# Patient Record
Sex: MALE | Race: OTHER
[De-identification: names, ages, dates, MRNs, and addresses within clinical notes are randomized per-mention and may not be internally consistent; named-entity substitution may affect disease eponyms.]

---

## 2018-02-06 ENCOUNTER — HOSPITAL ENCOUNTER (EMERGENCY)
Dept: HOSPITAL 72 - EMR | Age: 38
Discharge: HOME | End: 2018-02-06
Payer: SELF-PAY

## 2018-02-06 VITALS — SYSTOLIC BLOOD PRESSURE: 145 MMHG | DIASTOLIC BLOOD PRESSURE: 84 MMHG

## 2018-02-06 VITALS — BODY MASS INDEX: 26.68 KG/M2 | HEIGHT: 67 IN | WEIGHT: 170 LBS

## 2018-02-06 VITALS — SYSTOLIC BLOOD PRESSURE: 118 MMHG | DIASTOLIC BLOOD PRESSURE: 76 MMHG

## 2018-02-06 DIAGNOSIS — R10.13: Primary | ICD-10-CM

## 2018-02-06 LAB
ADD MANUAL DIFF: NO
ALBUMIN SERPL-MCNC: 4.2 G/DL (ref 3.4–5)
ALBUMIN/GLOB SERPL: 1.2 {RATIO} (ref 1–2.7)
ALP SERPL-CCNC: 87 U/L (ref 46–116)
ALT SERPL-CCNC: 65 U/L (ref 12–78)
ANION GAP SERPL CALC-SCNC: 6 MMOL/L (ref 5–15)
AST SERPL-CCNC: 26 U/L (ref 15–37)
BASOPHILS NFR BLD AUTO: 0.6 % (ref 0–2)
BILIRUB SERPL-MCNC: 0.5 MG/DL (ref 0.2–1)
BUN SERPL-MCNC: 14 MG/DL (ref 7–18)
CALCIUM SERPL-MCNC: 9 MG/DL (ref 8.5–10.1)
CHLORIDE SERPL-SCNC: 98 MMOL/L (ref 98–107)
CO2 SERPL-SCNC: 30 MMOL/L (ref 21–32)
CREAT SERPL-MCNC: 1 MG/DL (ref 0.55–1.3)
EOSINOPHIL NFR BLD AUTO: 0.6 % (ref 0–3)
ERYTHROCYTE [DISTWIDTH] IN BLOOD BY AUTOMATED COUNT: 11.8 % (ref 11.6–14.8)
GLOBULIN SER-MCNC: 3.6 G/DL
HCT VFR BLD CALC: 47.7 % (ref 42–52)
HGB BLD-MCNC: 16.7 G/DL (ref 14.2–18)
LYMPHOCYTES NFR BLD AUTO: 12.1 % (ref 20–45)
MCV RBC AUTO: 87 FL (ref 80–99)
MONOCYTES NFR BLD AUTO: 4.9 % (ref 1–10)
NEUTROPHILS NFR BLD AUTO: 81.7 % (ref 45–75)
PLATELET # BLD: 198 K/UL (ref 150–450)
POTASSIUM SERPL-SCNC: 3.2 MMOL/L (ref 3.5–5.1)
RBC # BLD AUTO: 5.46 M/UL (ref 4.7–6.1)
SODIUM SERPL-SCNC: 134 MMOL/L (ref 136–145)
WBC # BLD AUTO: 9.8 K/UL (ref 4.8–10.8)

## 2018-02-06 PROCEDURE — 83690 ASSAY OF LIPASE: CPT

## 2018-02-06 PROCEDURE — 99284 EMERGENCY DEPT VISIT MOD MDM: CPT

## 2018-02-06 PROCEDURE — 85025 COMPLETE CBC W/AUTO DIFF WBC: CPT

## 2018-02-06 PROCEDURE — 83605 ASSAY OF LACTIC ACID: CPT

## 2018-02-06 PROCEDURE — 96374 THER/PROPH/DIAG INJ IV PUSH: CPT

## 2018-02-06 PROCEDURE — 80053 COMPREHEN METABOLIC PANEL: CPT

## 2018-02-06 PROCEDURE — 36415 COLL VENOUS BLD VENIPUNCTURE: CPT

## 2018-02-06 NOTE — EMERGENCY ROOM REPORT
History of Present Illness


General


Chief Complaint:  Abdominal Pain


Source:  Patient


 (RON MENDOZA M.D.)





Present Illness


HPI


37YOM with epigastric pain since 2am, 1 hour after given chicken/rice by 

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


No nausea/vomiting, diarrhea


No previous abd/pelvic surgery


No fever/chills


No sick contacts


Took yariel seltzer with some improvement


 (RON MENDOZA M.D.)


Allergies:  


Coded Allergies:  


     No Known Allergies (Unverified , 2/6/18)





Patient History


Past Medical History:  none


Past Surgical History:  none


Pertinent Family History:  none


Social History:  Denies: smoking, alcohol use, drug use


Immunizations:  UTD


Reviewed Nursing Documentation:  PMH: Agreed, PSxH: Agreed (RON MENDOZA M.D.)





Nursing Documentation-PMH


Past Medical History:  No Stated History


 (RON MENDOZA M.D.)





Review of Systems


All Other Systems:  negative except mentioned in HPI


 (RON MENDOZA M.D.)





Physical Exam





Vital Signs








  Date Time  Temp Pulse Resp B/P (MAP) Pulse Ox O2 Delivery O2 Flow Rate FiO2


 


2/6/18 05:24 97.3 69 18 118/76 97 Room Air  








Sp02 EP Interpretation:  reviewed, normal


General Appearance:  normal inspection, well appearing, no apparent distress, 

alert, GCS 15, non-toxic


Head:  normocephalic, atraumatic


Eyes:  bilateral eye PERRL, bilateral eye EOMI


ENT:  normal ENT inspection, hearing grossly normal, normal pharynx, no 

angioedema, normal voice, TMs + canals normal, uvula midline, moist mucus 

membranes


Neck:  normal inspection, full range of motion, supple, thyroid normal, no 

meningismus, no bony tend


Respiratory:  normal inspection, lungs clear, normal breath sounds, no rhonchi, 

no respiratory distress, no retraction, no accessory muscle use, no wheezing, 

speaking full sentences


Cardiovascular #1:  regular rate, rhythm, no edema, no JVD, normal capillary 

refill


Gastrointestinal:  normal inspection, normal bowel sounds, soft, no mass, no 

peritonitis, non-distended, no guarding, no hernia, no pulsatile mass, other - +

epigastric ttp


Genitourinary:  no CVA tenderness


Musculoskeletal:  normal inspection, back normal, normal range of motion, no 

calf tenderness, pelvis stable, Arleth's Sign negative


Neurologic:  normal inspection, alert, oriented x3, responsive, CNs III-XII nml 

as tested, motor strength/tone normal, cerebellar normal, normal gait, speech 

normal


Psychiatric:  normal inspection, judgement/insight normal, mood/affect normal, 

no suicidal/homicidal ideation, no delusions


Skin:  normal inspection, normal color, no rash


Lymphatic:  normal inspection, no adenopathy


 (RON MENDOZA M.D.)


Sp02 EP Interpretation:  reviewed, normal


General Appearance:  no apparent distress, alert, GCS 15, non-toxic


Head:  normocephalic, atraumatic


Eyes:  bilateral eye normal inspection, bilateral eye PERRL


ENT:  hearing grossly normal, normal pharynx, no angioedema, normal voice


Neck:  full range of motion, supple/symm/no masses


Respiratory:  chest non-tender, lungs clear, normal breath sounds, speaking 

full sentences


Cardiovascular #1:  regular rate, rhythm, no edema


Gastrointestinal:  normal bowel sounds, soft, non-distended, no guarding, no 

rebound, tenderness - ttp in the epigastrium


Rectal:  deferred


Musculoskeletal:  back normal, gait/station normal, normal range of motion, non-

tender


Neurologic:  alert, oriented x3, responsive, motor strength/tone normal, 

sensory intact, speech normal


Psychiatric:  judgement/insight normal, memory normal, mood/affect normal, no 

suicidal/homicidal ideation


Skin:  normal color, no rash, warm/dry, well hydrated


 (DAVID AMAYAOJuan C)





Medical Decision Making


Diagnostic Impression:  


 Primary Impression:  


 Abdominal pain


 Qualified Codes:  R10.13 - Epigastric pain


ER Course


VSS, Afebrile


HPI, PE, assessment c/w gastritis


Labs: No leuks; no metabolic or LFT abnormalities to suggest pancreatitis or 

biliary disease


Lactate WNL


Patient feels "a little better" after serial assessment at time of signout


Endorsed to Dr Gonzalez at 630am for ultimate reassessment and disposition


 (RON MENDOZA M.D.)


ER Course


This patient has a clinical presentation consistent with gastritis.  The 

location of the pain and history and physical examination is consistent with 

this.  I considered other concerning differentials, to include appendicitis, 

cholelithiasis, cholecystitis, pancreatitis, perforated viscus, aortic aneurysm

, and pyelonephritis to name a few.  However, laboratory workup in combination 

with medical and surgical history and physical exam makes these unlikely at 

this time.  I did educate the patient extensively on early appendicitis.  I 

educated the patient that this could be undiagnosed early appendicitis.  He was 

instructed to return for persistent symptoms or symptoms that migrates to the 

right lower quadrant.  He is also instructed to return immediately for fever.  

I did educate the patient on close return precautions and followup instructions.





Laboratory Tests








Test


  2/6/18


05:55


 


White Blood Count


  9.8 K/UL


(4.8-10.8)


 


Red Blood Count


  5.46 M/UL


(4.70-6.10)


 


Hemoglobin


  16.7 G/DL


(14.2-18.0)


 


Hematocrit


  47.7 %


(42.0-52.0)


 


Mean Corpuscular Volume 87 FL (80-99)  


 


Mean Corpuscular Hemoglobin


  30.6 PG


(27.0-31.0)


 


Mean Corpuscular Hemoglobin


Concent 35.0 G/DL


(32.0-36.0)


 


Red Cell Distribution Width


  11.8 %


(11.6-14.8)


 


Platelet Count


  198 K/UL


(150-450)


 


Mean Platelet Volume


  9.8 FL


(6.5-10.1)


 


Neutrophils (%) (Auto)


  81.7 %


(45.0-75.0)  H


 


Lymphocytes (%) (Auto)


  12.1 %


(20.0-45.0)  L


 


Monocytes (%) (Auto)


  4.9 %


(1.0-10.0)


 


Eosinophils (%) (Auto)


  0.6 %


(0.0-3.0)


 


Basophils (%) (Auto)


  0.6 %


(0.0-2.0)


 


Sodium Level


  134 MMOL/L


(136-145)  L


 


Potassium Level


  3.2 MMOL/L


(3.5-5.1)  L


 


Chloride Level


  98 MMOL/L


()


 


Carbon Dioxide Level


  30 MMOL/L


(21-32)


 


Anion Gap


  6 mmol/L


(5-15)


 


Blood Urea Nitrogen


  14 mg/dL


(7-18)


 


Creatinine


  1.0 MG/DL


(0.55-1.30)


 


Estimate Glomerular


Filtration Rate > 60 mL/min


(>60)


 


Glucose Level


  135 MG/DL


()  H


 


Lactic Acid Level


  1.30 mmol/L


(0.66-2.22)


 


Calcium Level


  9.0 MG/DL


(8.5-10.1)


 


Total Bilirubin


  0.5 MG/DL


(0.2-1.0)


 


Aspartate Amino Transferase


(AST) 26 U/L (15-37)


 


 


Alanine Aminotransferase (ALT)


  65 U/L (12-78)


 


 


Alkaline Phosphatase


  87 U/L


()


 


Total Protein


  7.8 G/DL


(6.4-8.2)


 


Albumin


  4.2 G/DL


(3.4-5.0)


 


Globulin 3.6 g/dL  


 


Albumin/Globulin Ratio 1.2 (1.0-2.7)  


 


Lipase


  138 U/L


()








 (DAVID AMAYA D.O.)





Last Vital Signs








  Date Time  Temp Pulse Resp B/P (MAP) Pulse Ox O2 Delivery O2 Flow Rate FiO2


 


2/6/18 05:24 97.3 69 18 118/76 97 Room Air  








Status:  improved


 (RON MENDOZA M.D.)


Disposition:  HOME, SELF-CARE


Condition:  Improved


Scripts


Pantoprazole* (PROTONIX*) 40 Mg Tablet.dr


40 MG ORAL DAILY, #30 TAB


   Prov: DAVID AMAYA D.O.         2/6/18 


Ondansetron Odt* (ZOFRAN ODT*) 4 Mg Tab.rapdis


4 MG ORAL Q6H Y for Nausea & Vomiting, #10 TAB 0 Refills


   Prov: DAVID AMAYA D.O.         2/6/18 


Mag Hydrox/Al Hydrox/Simeth (MAALOX MAXIMUM STRENGTH SUSP) 355 Ml Oral.susp


15 ML PO Q4HR, #355 ML


   Prov: DAVID AMAYA D.O.         2/6/18


Patient Instructions:  Gastritis, Adult, Abdominal Pain, Adult











RON MENDOZA M.D. Feb 6, 2018 05:41


DAVID AMAYA D.O. Feb 6, 2018 07:53

## 2018-02-07 ENCOUNTER — HOSPITAL ENCOUNTER (INPATIENT)
Dept: HOSPITAL 72 - EMR | Age: 38
LOS: 1 days | Discharge: HOME | DRG: 284 | End: 2018-02-08
Payer: MEDICAID

## 2018-02-07 VITALS — DIASTOLIC BLOOD PRESSURE: 61 MMHG | SYSTOLIC BLOOD PRESSURE: 115 MMHG

## 2018-02-07 VITALS — SYSTOLIC BLOOD PRESSURE: 101 MMHG | DIASTOLIC BLOOD PRESSURE: 68 MMHG

## 2018-02-07 VITALS — DIASTOLIC BLOOD PRESSURE: 69 MMHG | SYSTOLIC BLOOD PRESSURE: 118 MMHG

## 2018-02-07 VITALS — SYSTOLIC BLOOD PRESSURE: 118 MMHG | DIASTOLIC BLOOD PRESSURE: 67 MMHG

## 2018-02-07 VITALS — DIASTOLIC BLOOD PRESSURE: 65 MMHG | SYSTOLIC BLOOD PRESSURE: 116 MMHG

## 2018-02-07 VITALS — HEIGHT: 67 IN | WEIGHT: 175 LBS | BODY MASS INDEX: 27.47 KG/M2

## 2018-02-07 VITALS — SYSTOLIC BLOOD PRESSURE: 146 MMHG | DIASTOLIC BLOOD PRESSURE: 82 MMHG

## 2018-02-07 VITALS — SYSTOLIC BLOOD PRESSURE: 116 MMHG | DIASTOLIC BLOOD PRESSURE: 64 MMHG

## 2018-02-07 DIAGNOSIS — K21.9: ICD-10-CM

## 2018-02-07 DIAGNOSIS — K80.70: Primary | ICD-10-CM

## 2018-02-07 LAB
ADD MANUAL DIFF: NO
ALBUMIN SERPL-MCNC: 4.3 G/DL (ref 3.4–5)
ALBUMIN/GLOB SERPL: 1.2 {RATIO} (ref 1–2.7)
ALP SERPL-CCNC: 77 U/L (ref 46–116)
ALT SERPL-CCNC: 60 U/L (ref 12–78)
ANION GAP SERPL CALC-SCNC: 8 MMOL/L (ref 5–15)
AST SERPL-CCNC: 24 U/L (ref 15–37)
BASOPHILS NFR BLD AUTO: 0.5 % (ref 0–2)
BILIRUB SERPL-MCNC: 0.5 MG/DL (ref 0.2–1)
BUN SERPL-MCNC: 7 MG/DL (ref 7–18)
CALCIUM SERPL-MCNC: 9.2 MG/DL (ref 8.5–10.1)
CHLORIDE SERPL-SCNC: 101 MMOL/L (ref 98–107)
CO2 SERPL-SCNC: 31 MMOL/L (ref 21–32)
CREAT SERPL-MCNC: 1 MG/DL (ref 0.55–1.3)
EOSINOPHIL NFR BLD AUTO: 0 % (ref 0–3)
ERYTHROCYTE [DISTWIDTH] IN BLOOD BY AUTOMATED COUNT: 11.4 % (ref 11.6–14.8)
GLOBULIN SER-MCNC: 3.5 G/DL
HCT VFR BLD CALC: 47.2 % (ref 42–52)
HGB BLD-MCNC: 16.8 G/DL (ref 14.2–18)
LYMPHOCYTES NFR BLD AUTO: 12 % (ref 20–45)
MCV RBC AUTO: 86 FL (ref 80–99)
MONOCYTES NFR BLD AUTO: 10 % (ref 1–10)
NEUTROPHILS NFR BLD AUTO: 77.5 % (ref 45–75)
PLATELET # BLD: 214 K/UL (ref 150–450)
POTASSIUM SERPL-SCNC: 3.3 MMOL/L (ref 3.5–5.1)
RBC # BLD AUTO: 5.49 M/UL (ref 4.7–6.1)
SODIUM SERPL-SCNC: 140 MMOL/L (ref 136–145)
WBC # BLD AUTO: 11.7 K/UL (ref 4.8–10.8)

## 2018-02-07 PROCEDURE — 74177 CT ABD & PELVIS W/CONTRAST: CPT

## 2018-02-07 PROCEDURE — 36415 COLL VENOUS BLD VENIPUNCTURE: CPT

## 2018-02-07 PROCEDURE — 85730 THROMBOPLASTIN TIME PARTIAL: CPT

## 2018-02-07 PROCEDURE — 78266 GSTR EMPT IMG SM BWL&COLON: CPT

## 2018-02-07 PROCEDURE — 99285 EMERGENCY DEPT VISIT HI MDM: CPT

## 2018-02-07 PROCEDURE — 83690 ASSAY OF LIPASE: CPT

## 2018-02-07 PROCEDURE — 85025 COMPLETE CBC W/AUTO DIFF WBC: CPT

## 2018-02-07 PROCEDURE — 80053 COMPREHEN METABOLIC PANEL: CPT

## 2018-02-07 PROCEDURE — 82150 ASSAY OF AMYLASE: CPT

## 2018-02-07 PROCEDURE — 76700 US EXAM ABDOM COMPLETE: CPT

## 2018-02-07 RX ADMIN — HEPARIN SODIUM SCH UNITS: 5000 INJECTION INTRAVENOUS; SUBCUTANEOUS at 21:20

## 2018-02-07 RX ADMIN — DEXTROSE AND SODIUM CHLORIDE SCH MLS/HR: 5; .45 INJECTION, SOLUTION INTRAVENOUS at 22:29

## 2018-02-07 RX ADMIN — DEXTROSE AND SODIUM CHLORIDE SCH MLS/HR: 5; .45 INJECTION, SOLUTION INTRAVENOUS at 18:45

## 2018-02-07 RX ADMIN — PANTOPRAZOLE SODIUM SCH MG: 40 INJECTION, POWDER, FOR SOLUTION INTRAVENOUS at 08:26

## 2018-02-07 RX ADMIN — HEPARIN SODIUM SCH UNITS: 5000 INJECTION INTRAVENOUS; SUBCUTANEOUS at 08:31

## 2018-02-07 RX ADMIN — DEXTROSE AND SODIUM CHLORIDE SCH MLS/HR: 5; .45 INJECTION, SOLUTION INTRAVENOUS at 06:18

## 2018-02-07 NOTE — DIAGNOSTIC IMAGING REPORT
Indication: Abdominal pain

 

Technique: Gray-scale and duplex images of the upper abdomen were obtained

 

Comparison: Reference made to CT scan performed one hour earlier

 

Findings: Gallbladder is filled with gallstones. Gallbladder wall does not appear to

be thickened.  Sonographic Berumen's sign is negative.  Common bile duct measures 5 mm

in diameter.  No intrahepatic biliary ductal dilatation.  Liver demonstrates

diffusely increased echogenicity, consistent with fatty change described on recent

CT.   Portal vein and hepatic veins are patent.   Pancreas is obscured by bowel gas. 

  Spleen is unremarkable.  Left kidney measures 10.9 cm in length.  Right kidney

measures 10.3 cm length.  Both kidneys demonstrate normal echogenicity.  There is no

hydronephrosis.   No focal abnormality . Abdominal aorta is partially obscured by

bowel gas, visualized portions are non-aneurysmal .

 

Impression: Cholelithiasis. No definite gallbladder wall thickening. Negative

sonographic Berumen's. Negative for dilated ducts

 

Liver demonstrates diffusely increased echogenicity, consistent with fatty change

demonstrated on recent CT scan.

 

Note nonvisualization of the pancreas, suboptimal visualization of the abdominal

aorta

## 2018-02-07 NOTE — CONSULTATION
History of Present Illness


General


Date patient seen:  Feb 7, 2018


Chief Complaint:  Abdominal Pain


Reason for Consultation:  abdominal pain





Present Illness


HPI


37 year old otherwise healthy mail presented to ED with complaints of 

intractable abdominal pain.  Patient states that 2 days prior to admission he 

first noted pain.  Pain described as intermittent epigastric/Left upper 

quadrant pain that is 10/10 at max.  Pain without radiation.  no n/v/f/c.  

Initially came to ED day prior to admission with similar symptoms and improved 

prior to d/c.  went home with Rx but developed symptoms again so came back to 

ED.  Cannot recall if food correlation.  States usually has 3-4 BM's per day 

but has been constipated the past few days.  In ED had CT scan which 

demonstrated gallstones.  Ultrasound without significant findings.  Surgery 

called to evaluate.  When seen at bedside this AM patient states that pain has 

resolved.  Currently comfortable.  Has not had prior episodes.


Allergies:  


Coded Allergies:  


     No Known Allergies (Unverified , 2/6/18)





Medication History


Scheduled


Mag Hydrox/Al Hydrox/Simeth (Maalox Maximum Strength Susp), 15 ML PO Q4HR


No Known Medications* (NKM - No Known Medications*), 0 ., (Reported)


Pantoprazole* (Protonix*), 40 MG ORAL DAILY





Scheduled PRN


Ondansetron Odt* (Zofran Odt*), 4 MG ORAL Q6H PRN for Nausea & Vomiting





Patient History


History Provided By:  Patient


Healthcare decision maker





Resuscitation status


Full Code


Advanced Directive on File








Past Medical/Surgical History


Past Medical/Surgical History:  


(1) Abdominal pain


(2) Cholelithiasis


(3) Biliary colic





Review of Systems


Constitutional:  Denies: no symptoms, see HPI, chills, sweats, fever, malaise, 

weakness, other


Eye:  Denies: no symptoms, see HPI, eye pain, blurred vision, tearing, double 

vision, nose pain, nose congestion, acuity changes, discharge, other


ENT:  Denies: no symptoms, see HPI, ear pain, ear discharge, nose pain, nose 

congestion, throat pain, throat swelling, mouth pain, hearing loss, nasal 

discharge, other


Cardiovascular:  Denies: no symptoms, see HPI, chest pain, edema, palpitations, 

syncope, PND, other


Gastrointestinal:  Reports: abdominal pain, constipation


Genitourinary:  Denies: no symptoms, see HPI, discharge, dysuria, frequency, 

hematuria, pain, retention, incontinence, urgency, vag bleed/dc, other


Musculoskeletal:  Denies: no symptoms, see HPI, back pain, gout, joint pain, 

joint swelling, muscle pain, muscle stiffness, other


Skin:  Denies: no symptoms, see HPI, rash, change in color, change in hair/nails

, dryness, lesions, other


Psychiatric:  Denies: no symptoms, see HPI, prior hx, anxiety, depressed 

feelings, emotional problems, SI, HI, hallucinations, other


Neurological:  Denies: no symptoms, see HPI, headache, numbness, paresthesia, 

seizure, tingling, tremors, focal weakness, syncope, dizziness, other


Endocrine:  Denies: no symptoms, see HPI, excessive sweating, flushing, 

intolerance to temperature, increased thirst, increased urine, unexplained 

weight loss, other


Hematologic/Lymphatic:  Denies: no symptoms, see HPI, anemia, blood clots, easy 

bleeding, easy bruising, swollen glands, diathesis, other





Physical Exam


General Appearance:  no apparent distress, alert


Lines, tubes and drains:  peripheral


HEENT:  normocephalic, atraumatic, PERRL


Neck:  supple, normal inspection


Respiratory/Chest:  normal breath sounds, no respiratory distress, no accessory 

muscle use


Cardiovascular/Chest:  normal peripheral pulses, normal rate, regular rhythm


Abdomen:  normal bowel sounds, non tender, soft, no organomegaly, no mass


Extremities:  non-tender, normal inspection


Skin Exam:  normal pigmentation, warm/dry


Neurologic:  alert, oriented x 3





Last 24 Hour Vital Signs








  Date Time  Temp Pulse Resp B/P (MAP) Pulse Ox O2 Delivery O2 Flow Rate FiO2


 


2/7/18 08:00 97.7 56 20 116/64 98   


 


2/7/18 04:15 97.9 59 16 115/61 98   


 


2/7/18 04:11  65 16 101/68 97 Room Air  


 


2/7/18 03:44 98.8 65 16 101/68 97 Room Air  


 


2/7/18 00:40 98.4  19 146/82 96 Room Air  


 


2/7/18 00:10 98.4 69 19 146/82 96 Room Air  

















Intake and Output  


 


 2/6/18 2/7/18





 19:00 07:00


 


Intake Total  1160 ml


 


Balance  1160 ml


 


  


 


IV Total  1160 ml


 


# Voids  2











Laboratory Tests








Test


  2/7/18


00:50


 


White Blood Count


  11.7 K/UL


(4.8-10.8)  H


 


Red Blood Count


  5.49 M/UL


(4.70-6.10)


 


Hemoglobin


  16.8 G/DL


(14.2-18.0)


 


Hematocrit


  47.2 %


(42.0-52.0)


 


Mean Corpuscular Volume 86 FL (80-99)  


 


Mean Corpuscular Hemoglobin


  30.6 PG


(27.0-31.0)


 


Mean Corpuscular Hemoglobin


Concent 35.7 G/DL


(32.0-36.0)


 


Red Cell Distribution Width


  11.4 %


(11.6-14.8)  L


 


Platelet Count


  214 K/UL


(150-450)


 


Mean Platelet Volume


  9.1 FL


(6.5-10.1)


 


Neutrophils (%) (Auto)


  77.5 %


(45.0-75.0)  H


 


Lymphocytes (%) (Auto)


  12.0 %


(20.0-45.0)  L


 


Monocytes (%) (Auto)


  10.0 %


(1.0-10.0)


 


Eosinophils (%) (Auto)


  0.0 %


(0.0-3.0)


 


Basophils (%) (Auto)


  0.5 %


(0.0-2.0)


 


Sodium Level


  140 MMOL/L


(136-145)


 


Potassium Level


  3.3 MMOL/L


(3.5-5.1)  L


 


Chloride Level


  101 MMOL/L


()


 


Carbon Dioxide Level


  31 MMOL/L


(21-32)


 


Anion Gap


  8 mmol/L


(5-15)


 


Blood Urea Nitrogen


  7 mg/dL (7-18)


 


 


Creatinine


  1.0 MG/DL


(0.55-1.30)


 


Estimat Glomerular Filtration


Rate > 60 mL/min


(>60)


 


Glucose Level


  133 MG/DL


()  H


 


Calcium Level


  9.2 MG/DL


(8.5-10.1)


 


Total Bilirubin


  0.5 MG/DL


(0.2-1.0)


 


Aspartate Amino Transf


(AST/SGOT) 24 U/L (15-37)


 


 


Alanine Aminotransferase


(ALT/SGPT) 60 U/L (12-78)


 


 


Alkaline Phosphatase


  77 U/L


()


 


Total Protein


  7.8 G/DL


(6.4-8.2)


 


Albumin


  4.3 G/DL


(3.4-5.0)


 


Globulin 3.5 g/dL  


 


Albumin/Globulin Ratio 1.2 (1.0-2.7)  


 


Lipase


  94 U/L


()








Height (Feet):  5


Height (Inches):  7.00


Weight (Pounds):  175


Medications





Current Medications








 Medications


  (Trade)  Dose


 Ordered  Sig/Anali


 Route


 PRN Reason  Start Time


 Stop Time Status Last Admin


Dose Admin


 


 Acetaminophen


  (Tylenol)  650 mg  Q4H  PRN


 ORAL


 fever  2/7/18 05:45


 3/9/18 05:44   


 


 


 Al Hydroxide/Mg


 Hydroxide


  (Mylanta II)  30 ml  Q6H  PRN


 ORAL


 dyspepsia  2/7/18 05:45


 3/9/18 05:44   


 


 


 Dextrose


  (Dextrose 50%)    STAT  PRN


 IV


 Hypoglycemia  2/7/18 05:45


 3/9/18 05:44   


 


 


 Dextrose/Sodium


 Chloride  1,000 ml @ 


 75 mls/hr  [id number]


 IV


   2/7/18 05:34


 3/9/18 05:33  2/7/18 06:18


 


 


 Diphenhydramine


 HCl


  (Benadryl)  25 mg  Q6H  PRN


 ORAL


 Itching/Pruritis  2/7/18 05:45


 3/9/18 05:44   


 


 


 Heparin Sodium


  (Porcine)


  (Heparin 5000


 units/ml)  5,000 units  EVERY 12  HOURS


 SUBQ


   2/7/18 09:00


 3/9/18 08:59  2/7/18 08:31


 


 


 Morphine Sulfate


  (Morphine


 Sulfate)  2 mg  EVERY 4 HOURS  PRN


 IVP


 severe  Pain (Pain Scale 7-10)  2/7/18 05:45


 2/14/18 05:44   


 


 


 Nitroglycerin


  (Ntg)  0.4 mg  Q5M X 3 DOSES PRN


 SL


 Prn Chest Pain  2/7/18 05:45


 3/9/18 05:44   


 


 


 Ondansetron HCl


  (Zofran)  4 mg  Q6H  PRN


 IVP


 Nausea & Vomiting  2/7/18 05:45


 3/9/18 05:44   


 


 


 Pantoprazole


  (Protonix)  40 mg  DAILY


 IVP


   2/7/18 09:00


 3/9/18 08:59  2/7/18 08:26


 


 


 Polyethylene


 Glycol


  (Miralax)  17 gm  HSPRN  PRN


 ORAL


 Constipation  2/7/18 05:45


 3/9/18 05:44   


 


 


 Temazepam


  (Restoril)  15 mg  HSPRN  PRN


 ORAL


 Insomnia  2/7/18 05:45


 2/14/18 05:44   


 











Assessment/Plan


Problem List:  


(1) Abdominal pain


Assessment & Plan:  37M with abdominal pain.  Feels as if mainly epigastric and 

LUQ.  no radiation.  intermittent for the past two days.  currently resolved.  

Afebrile, HD stable, labs okay (mild leukocytosis of 11k).  CT with stones.  US 

with stones.  Exam benign currently.  





Lots of stones in GB and possibly one impacted at GB neck?  possible gastritis?

  possible enteritis?   possible constipation?





Will order HIDA to ensure not gallbladder issue


NPO


IV fluids


will follow exam.  


thank you for this consult.  will follow with recs.


ICD Codes:  R10.9 - Unspecified abdominal pain


SNOMED:  [id number]


Qualifiers:  


   Qualified Codes:  R10.13 - Epigastric pain


Status:  stable











RamonaLukas Feb 7, 2018 10:00

## 2018-02-07 NOTE — DIAGNOSTIC IMAGING REPORT
Indication: Reason For Exam: ABD PAIN

 

Technique: IV administration 5.2 mCi 99m technetium mebrofenin. Images obtained over

the liver for 2 hours. At 60 minutes, 2 mg of morphine given intravenously

 

Comparison: Reference made to ultrasound and CT scan earlier the same day

 

Findings: Prompt hepatic tracer excretion. Photopenic defect seen in the gallbladder

fossa. Common bile duct visualized at 7 minutes. Activity in the duodenum visualized

at 10 minutes. No gallbladder activity is ever demonstrated

 

Impression: Nonvisualization of the gallbladder. This is concerning for acute

cholecystitis.

 

Patent cystic duct

 

Dr. Jaime notified by phone of findings at the time of interpretation

## 2018-02-07 NOTE — DIAGNOSTIC IMAGING REPORT
Clinical Indication: Abdominal pain

 

Technique:   No oral contrast utilized, per emergency room physician request. IV

administration nonionic contrast. Venous phase spiral acquisition obtained through

the abdomen and pelvis. Multiplanar reconstructions were generated. Total dose length

product 738.01 mGycm. CTDIvol(s) 13.21 mGy. Dose reduction achieved using automated

exposure control

 

 

Comparison: none

 

Findings: Appendix is normal in caliber and contains gas. No evidence of

diverticulosis or diverticulitis. No small bowel distention. No free or loculated

intraperitoneal air or fluid is evident. Distal esophagus is unremarkable. The

stomach is distended. The duodenum is unremarkable.

 

The liver is diffusely low in attenuation. A subcentimeter low-attenuation lesion is

seen in segment 4A. Gallbladder contains multiple calcified gallstones. There is

suggestion of gallbladder wall thickening/edema. This is best appreciated on the

coronal images. No biliary ductal dilatation. The pancreas, spleen, adrenals, kidneys

are unremarkable. No retroperitoneal mass or adenopathy. The prostate and seminal

vesicles are prominent. The bladder is somewhat distended. Included lung bases are

clear. The bones are unremarkable.

 

Impression: Cholelithiasis. Suggestion of gallbladder wall thickening raises concern

for acute cholecystitis. Consider hepatobiliary nuclear scan if there is high

clinical suspicion

 

Fatty liver

 

Prominent prostate and seminal vesicles for age

 

This agrees with the preliminary interpretation provided overnight by [facility]

teleradiology service.

 

 

 

 

 

The CT scanner at [facility] is accredited by the [employer] College of

Radiology and the scans are performed using protocols designed to limit radiation

exposure to as low as reasonably achievable to attain images of sufficient resolution

adequate for diagnostic evaluation.

## 2018-02-07 NOTE — GENERAL PROGRESS NOTE
Progress Note


Progress Note


Surgery:





patient seen and examined at bedside with no acute events.  doing well.  states 

still no pain.  pain resolved soon after admission.  no n/v/f/c.  passing 

flatus.  Discussed CT findings, Ultrasound findings, and HIDA scan results with 

patient.  Reviewed history again.  Patient with Left upper abdominal pain 

without radiation.  no right sided pain.  has never had similar symptoms and 

recent episodes are first time and have resolved.  Afebrile, HD stable, no 

leukocytosis, labs okay.  Exam benign.  





given HIDA findings there is potential for gallbladder etiology but history of 

LUQ pain, normal ultrasound and CT except for cholelithiasis, and normal labs 

do not correlate.  could potentially be atypical pain for cholecystitis.  


Offered cholecystectomy to patient.  explained risks, benefits, alternatives, 

and possibility of LUQ pain returning even after rudy.  Patient expressed 

understanding and stated that given above he would rather wait and see if pain 

returns or is resolved.  He is not interested in surgery at this time.  





-will trial oral diet. 


-possible d/c tomorrow if tolerates diet.











Lukas Greene Feb 7, 2018 17:20

## 2018-02-07 NOTE — EMERGENCY ROOM REPORT
History of Present Illness


General


Chief Complaint:  Abdominal Pain


Source:  Patient





Present Illness


HPI


37-year-old male presents ED complaining of abdominal pain with vomiting.  

Started a few hours prior to arrival.  Sharp, 10 out of 10, nonradiating.  

Denies diarrhea.  Denies chest pain shortness of breath denies fevers or 

chills.  States he was here yesterday for similar presentation.  Was given 

prescriptions for states they are not helping.  No other aggravating relieving 

factors.  Denies any other associated symptoms


Allergies:  


Coded Allergies:  


     No Known Allergies (Unverified , 2/6/18)





Patient History


Past Medical History:  none


Past Surgical History:  none


Pertinent Family History:  none


Social History:  Denies: smoking, alcohol use, drug use


Immunizations:  UTD


Reviewed Nursing Documentation:  PMH: Agreed, PSxH: Agreed





Nursing Documentation-PMH


Past Medical History:  No Stated History





Review of Systems


All Other Systems:  negative except mentioned in HPI





Physical Exam





Vital Signs








  Date Time  Temp Pulse Resp B/P (MAP) Pulse Ox O2 Delivery O2 Flow Rate FiO2


 


2/7/18 00:10 98.4 69 19 146/82 96 Room Air  








Sp02 EP Interpretation:  reviewed, normal


General Appearance:  no apparent distress, alert, GCS 15, non-toxic


Head:  normocephalic


Eyes:  bilateral eye normal inspection, bilateral eye PERRL


ENT:  normal ENT inspection


Neck:  normal inspection


Respiratory:  chest non-tender, lungs clear, normal breath sounds, speaking 

full sentences


Cardiovascular #1:  regular rate, rhythm, no edema


Gastrointestinal:  normal bowel sounds, soft, non-distended, no guarding, no 

rebound, tenderness - epigastric


Rectal:  deferred


Genitourinary:  no CVA tenderness


Musculoskeletal:  normal inspection


Neurologic:  alert, oriented x3, responsive, motor strength/tone normal, 

sensory intact, speech normal


Psychiatric:  normal inspection


Skin:  normal inspection


Lymphatic:  normal inspection





Medical Decision Making


Diagnostic Impression:  


 Primary Impression:  


 Biliary colic


ER Course


Hospital Course 


37-year-old M presents to ED with abd pain





Differential diagnoses include: Appendicitis, cholecystitis, small bowel 

obstruction





Clinical course


Patient placed on stretcher.  Cardiac monitor.  After initial history and 

physical I ordered labs, IV fluids, UA, pain medication and CT





Labs - minimal leukocytosis noted, Hb/Hct stable.  electrolytes ok. LFTs ok


CT A/P - ? cholecystitis


ABD  US - lots of gallstones





Antibiotics given.  Case discussed with Dr. Greene and he agreed to consult.

  





Case discussed with Dr. Rios and he agreed to accept the patient to his 

service for further care and support 





I feel this is a highly complex case requiring extensive working including EKG/

Rhythm strip, Xray/CT/US, Blood/urine lab work, repeat exams while in ED, and 

administration of strong opiates/narcotics for pain control, admission to 

hospital or close patient follow up.  





Diagnosis - biliary colic





Patient admitted to hospital in serious condition





Labs








Test


  2/7/18


00:50


 


White Blood Count


  11.7 K/UL


(4.8-10.8)


 


Red Blood Count


  5.49 M/UL


(4.70-6.10)


 


Hemoglobin


  16.8 G/DL


(14.2-18.0)


 


Hematocrit


  47.2 %


(42.0-52.0)


 


Mean Corpuscular Volume 86 FL (80-99) 


 


Mean Corpuscular Hemoglobin


  30.6 PG


(27.0-31.0)


 


Mean Corpuscular Hemoglobin


Concent 35.7 G/DL


(32.0-36.0)


 


Red Cell Distribution Width


  11.4 %


(11.6-14.8)


 


Platelet Count


  214 K/UL


(150-450)


 


Mean Platelet Volume


  9.1 FL


(6.5-10.1)


 


Neutrophils (%) (Auto)


  77.5 %


(45.0-75.0)


 


Lymphocytes (%) (Auto)


  12.0 %


(20.0-45.0)


 


Monocytes (%) (Auto)


  10.0 %


(1.0-10.0)


 


Eosinophils (%) (Auto)


  0.0 %


(0.0-3.0)


 


Basophils (%) (Auto)


  0.5 %


(0.0-2.0)


 


Sodium Level


  140 MMOL/L


(136-145)


 


Potassium Level


  3.3 MMOL/L


(3.5-5.1)


 


Chloride Level


  101 MMOL/L


()


 


Carbon Dioxide Level


  31 MMOL/L


(21-32)


 


Anion Gap


  8 mmol/L


(5-15)


 


Blood Urea Nitrogen 7 mg/dL (7-18) 


 


Creatinine


  1.0 MG/DL


(0.55-1.30)


 


Estimat Glomerular Filtration


Rate > 60 mL/min


(>60)


 


Glucose Level


  133 MG/DL


()


 


Calcium Level


  9.2 MG/DL


(8.5-10.1)


 


Total Bilirubin


  0.5 MG/DL


(0.2-1.0)


 


Aspartate Amino Transf


(AST/SGOT) 24 U/L (15-37) 


 


 


Alanine Aminotransferase


(ALT/SGPT) 60 U/L (12-78) 


 


 


Alkaline Phosphatase


  77 U/L


()


 


Total Protein


  7.8 G/DL


(6.4-8.2)


 


Albumin


  4.3 G/DL


(3.4-5.0)


 


Globulin 3.5 g/dL 


 


Albumin/Globulin Ratio 1.2 (1.0-2.7) 


 


Lipase


  94 U/L


()








CT/MRI/US Diagnostic Results


CT/MRI/US Diagnostic Results #1:  


   Imaging Test Ordered:  CT A/P


   Impression


mulitple gallstones. pericholecystic fluid. ? cholecystitis


CT/MRI/US Diagnostic Results #2:  


   Imaging Test Ordered:  Abd US


   Impression


lots of gallstones. unable to further evaluate due to shadowing from stones





Last Vital Signs








  Date Time  Temp Pulse Resp B/P (MAP) Pulse Ox O2 Delivery O2 Flow Rate FiO2


 


2/7/18 00:40 98.4  19 146/82 96 Room Air  


 


2/7/18 00:10  69      








Status:  improved


Disposition:  ADMITTED AS INPATIENT


Condition:  Serious


Referrals:  


NOT CHOSEN IPA/MD,REFERRING (PCP)











DAVID BARRETO M.D. Feb 7, 2018 02:53

## 2018-02-07 NOTE — CONSULTATION
History of Present Illness


General


Date patient seen:  Feb 7, 2018


Chief Complaint:  Abdominal Pain


Referring physician:  REBECCA VARMA


Reason for Consultation:  abdominal pain





Present Illness


Allergies:  


Coded Allergies:  


     No Known Allergies (Unverified , 2/6/18)





Medication History


Scheduled


Mag Hydrox/Al Hydrox/Simeth (Maalox Maximum Strength Susp), 15 ML PO Q4HR


No Known Medications* (NKM - No Known Medications*), 0 ., (Reported)


Pantoprazole* (Protonix*), 40 MG ORAL DAILY





Scheduled PRN


Ondansetron Odt* (Zofran Odt*), 4 MG ORAL Q6H PRN for Nausea & Vomiting





Patient History


Healthcare decision maker





Resuscitation status


Full Code


Advanced Directive on File








Physical Exam





Last 24 Hour Vital Signs








  Date Time  Temp Pulse Resp B/P (MAP) Pulse Ox O2 Delivery O2 Flow Rate FiO2


 


2/7/18 16:00 98.2 75 18 118/69 96   


 


2/7/18 14:13 97.9       


 


2/7/18 11:37 97.9 56 19 118/67 98   


 


2/7/18 08:00 97.7 56 20 116/64 98   


 


2/7/18 04:15 97.9 59 16 115/61 98   


 


2/7/18 04:11  65 16 101/68 97 Room Air  


 


2/7/18 03:44 98.8 65 16 101/68 97 Room Air  


 


2/7/18 00:40 98.4  19 146/82 96 Room Air  


 


2/7/18 00:10 98.4 69 19 146/82 96 Room Air  

















Intake and Output  


 


 2/6/18 2/7/18





 19:00 07:00


 


Intake Total  1160 ml


 


Balance  1160 ml


 


  


 


IV Total  1160 ml


 


# Voids  2











Laboratory Tests








Test


  2/7/18


00:50


 


White Blood Count


  11.7 K/UL


(4.8-10.8)  H


 


Red Blood Count


  5.49 M/UL


(4.70-6.10)


 


Hemoglobin


  16.8 G/DL


(14.2-18.0)


 


Hematocrit


  47.2 %


(42.0-52.0)


 


Mean Corpuscular Volume 86 FL (80-99)  


 


Mean Corpuscular Hemoglobin


  30.6 PG


(27.0-31.0)


 


Mean Corpuscular Hemoglobin


Concent 35.7 G/DL


(32.0-36.0)


 


Red Cell Distribution Width


  11.4 %


(11.6-14.8)  L


 


Platelet Count


  214 K/UL


(150-450)


 


Mean Platelet Volume


  9.1 FL


(6.5-10.1)


 


Neutrophils (%) (Auto)


  77.5 %


(45.0-75.0)  H


 


Lymphocytes (%) (Auto)


  12.0 %


(20.0-45.0)  L


 


Monocytes (%) (Auto)


  10.0 %


(1.0-10.0)


 


Eosinophils (%) (Auto)


  0.0 %


(0.0-3.0)


 


Basophils (%) (Auto)


  0.5 %


(0.0-2.0)


 


Sodium Level


  140 MMOL/L


(136-145)


 


Potassium Level


  3.3 MMOL/L


(3.5-5.1)  L


 


Chloride Level


  101 MMOL/L


()


 


Carbon Dioxide Level


  31 MMOL/L


(21-32)


 


Anion Gap


  8 mmol/L


(5-15)


 


Blood Urea Nitrogen


  7 mg/dL (7-18)


 


 


Creatinine


  1.0 MG/DL


(0.55-1.30)


 


Estimat Glomerular Filtration


Rate > 60 mL/min


(>60)


 


Glucose Level


  133 MG/DL


()  H


 


Calcium Level


  9.2 MG/DL


(8.5-10.1)


 


Total Bilirubin


  0.5 MG/DL


(0.2-1.0)


 


Aspartate Amino Transf


(AST/SGOT) 24 U/L (15-37)


 


 


Alanine Aminotransferase


(ALT/SGPT) 60 U/L (12-78)


 


 


Alkaline Phosphatase


  77 U/L


()


 


Total Protein


  7.8 G/DL


(6.4-8.2)


 


Albumin


  4.3 G/DL


(3.4-5.0)


 


Globulin 3.5 g/dL  


 


Albumin/Globulin Ratio 1.2 (1.0-2.7)  


 


Lipase


  94 U/L


()








Height (Feet):  5


Height (Inches):  7.00


Weight (Pounds):  175


Medications





Current Medications








 Medications


  (Trade)  Dose


 Ordered  Sig/Anali


 Route


 PRN Reason  Start Time


 Stop Time Status Last Admin


Dose Admin


 


 Acetaminophen


  (Tylenol)  650 mg  Q4H  PRN


 ORAL


 fever  2/7/18 05:45


 3/9/18 05:44   


 


 


 Al Hydroxide/Mg


 Hydroxide


  (Mylanta II)  30 ml  Q6H  PRN


 ORAL


 dyspepsia  2/7/18 05:45


 3/9/18 05:44   


 


 


 Dextrose


  (Dextrose 50%)    STAT  PRN


 IV


 Hypoglycemia  2/7/18 05:45


 3/9/18 05:44   


 


 


 Dextrose/Sodium


 Chloride  1,000 ml @ 


 75 mls/hr  [id number]


 IV


   2/7/18 05:34


 3/9/18 05:33  2/7/18 06:18


 


 


 Diphenhydramine


 HCl


  (Benadryl)  25 mg  Q6H  PRN


 ORAL


 Itching/Pruritis  2/7/18 05:45


 3/9/18 05:44   


 


 


 Heparin Sodium


  (Porcine)


  (Heparin 5000


 units/ml)  5,000 units  EVERY 12  HOURS


 SUBQ


   2/7/18 09:00


 3/9/18 08:59  2/7/18 08:31


 


 


 Morphine Sulfate


  (Morphine


 Sulfate)  2 mg  EVERY 4 HOURS  PRN


 IVP


 severe  Pain (Pain Scale 7-10)  2/7/18 05:45


 2/14/18 05:44  2/7/18 13:43


 


 


 Nitroglycerin


  (Ntg)  0.4 mg  Q5M X 3 DOSES PRN


 SL


 Prn Chest Pain  2/7/18 05:45


 3/9/18 05:44   


 


 


 Ondansetron HCl


  (Zofran)  4 mg  Q6H  PRN


 IVP


 Nausea & Vomiting  2/7/18 05:45


 3/9/18 05:44   


 


 


 Pantoprazole


  (Protonix)  40 mg  DAILY


 IVP


   2/7/18 09:00


 3/9/18 08:59  2/7/18 08:26


 


 


 Polyethylene


 Glycol


  (Miralax)  17 gm  HSPRN  PRN


 ORAL


 Constipation  2/7/18 05:45


 3/9/18 05:44   


 


 


 Temazepam


  (Restoril)  15 mg  HSPRN  PRN


 ORAL


 Insomnia  2/7/18 05:45


 2/14/18 05:44   


 











Assessment/Plan


Problem List:  


(1) GERD (gastroesophageal reflux disease)


ICD Codes:  K21.9 - Gastro-esophageal reflux disease without esophagitis


SNOMED:  891992755


(2) Biliary colic


ICD Codes:  K80.50 - Calculus of bile duct without cholangitis or cholecystitis 

without obstruction


SNOMED:  04494047


(3) Cholelithiasis


ICD Codes:  K80.20 - Calculus of gallbladder without cholecystitis without 

obstruction


SNOMED:  469794665


Assessment/Plan


iv fluids


npo


surgical evaluation


check electrolytes


symptomatic treatmet


iv abx


dvt prophylaxis.











HESHAM RUBIO Feb 7, 2018 17:12

## 2018-02-07 NOTE — HISTORY AND PHYSICAL REPORT
DATE OF ADMISSION:  02/07/2018



TIME:  12 noon.



CONSULTANTS:

1. Vish Pedraza M.D.

2. Cisco Jaime M.D.

3. Lukas Greene M.D.



CHIEF COMPLAINT:  Abdominal pain x2 days.



BRIEF HISTORY:  The patient is a 37-year-old male who lives at home,

presents to [facility] ER last night with history of abdominal pain x2.  No

nausea or vomiting. Pain got worse.  The patient diagnosed with biliary

colic, possible cholecystitis and admitted to medical floor for further

treatment.  Currently, calm in bed, slight abdominal pain.  No

complaint.



PAST MEDICAL HISTORY:  Nothing.



PAST SURGICAL HISTORY:  Nothing.



MEDICATIONS:  K-Dur, heparin, Protonix, Tylenol, morphine, MiraLAX, Zofran,

Restoril, Benadryl, Mylanta, and nitroglycerin.



ALLERGIES:  Denies.



SOCIAL HISTORY:  No smoking.  Occasional alcohol.  No intravenous drug

abuse.



FAMILY HISTORY:  Noncontributory.



REVIEW OF SYSTEMS:  No chest pain.  No shortness of breath.  No nausea,

vomiting, or diarrhea.



PHYSICAL EXAMINATION:

GENERAL:  Calm in bed, oriented x3, no acute distress.

VITAL SIGNS:  Temperature is 97 degrees, pulse 56, respirations 19, and

blood pressure 118/67.

CARDIOVASCULAR:  No murmur.

LUNGS:  _____.

ABDOMEN:  Bowel sounds positive.  Soft, nontender, and nondistended.

EXTREMITIES:  No cyanosis or edema.

NEUROLOGIC:  The patient moves all extremities, slightly weak.



LABORATORY AND DIAGNOSTIC DATA:  White count 11.7, otherwise CBC is normal.

BMP showed potassium 3.3 and glucose 133, otherwise BMP is normal.



ASSESSMENT:  Biliary colic, cholecystitis.



PLAN:  Continue premedications.  NPO.  IV fluids.  Pain control.  GI, Dr. Jaime to follow and Surgery, Dr. Greene to follow.  CBC and BMP in

the morning.









  ______________________________________________

  Sergio Rios D.O.





DR:  STEPH

D:  02/07/2018 12:38

T:  02/07/2018 18:37

JOB#:  [id number]

CC:

## 2018-02-07 NOTE — GI INITIAL CONSULT NOTE
History of Present Illness


General


Date patient seen:  Feb 7, 2018


Time patient seen:  11:00


Reason for Hospitalization:  Abdominal Pain


Referring physician:  REBECCA VARMA


Reason for Consultation:  abdominal pain





Present Illness


HPI


37-year-old male presents ED complaining of abdominal pain with vomiting.  

Started a few hours prior to arrival.  Sharp, 10 out of 10, nonradiating.  

Denies diarrhea.  Denies chest pain shortness of breath denies fevers or 

chills.  States he was here yesterday for similar presentation.  Was given 

prescriptions for states they are not helping.  No other aggravating relieving 

factors.  Denies any other associated symptoms.


GI consulted for abdominal pain.   Pt seen on floor, awake A&Ox4 NAD with no 

active s/sx of N/V/D.  Had 10/10 epigastric pain, now denies.  No N/V/D.  No 

diarrhea.  No recent travels.  No changes in dietary habits.  States abdominal 

pain gets worse at night, thought it was because of his wife's cooking.  No 

history of endoscopy / colonoscopies.


Home Meds


Active Scripts


Pantoprazole* (PROTONIX*) 40 Mg Tablet.dr, 40 MG ORAL DAILY, #30 TAB


   Prov:DAVID AMAYA D.O.         2/6/18


Ondansetron Odt* (ZOFRAN ODT*) 4 Mg Tab.rapdis, 4 MG ORAL Q6H Y for Nausea & 

Vomiting, #10 TAB 0 Refills


   Prov:DAVID AMAYA D.O.         2/6/18


Mag Hydrox/Al Hydrox/Simeth (MAALOX MAXIMUM STRENGTH SUSP) 355 Ml Oral.susp, 15 

ML PO Q4HR, #355 ML


   Prov:DAVID AMAYA D.O.         2/6/18


Reported Medications


No Known Medications* (NKM - No Known Medications*)  ., 0 ., 0 Refills


   2/6/18


Med list reviewed/reconciled:  Yes


Allergies:  


Coded Allergies:  


     No Known Allergies (Unverified , 2/6/18)





Patient History


History Provided By:  Patient, Medical Record


PMH Narrative


Past Medical History:  none


Past Surgical History:  none


Pertinent Family History:  none


Social History:  Denies: smoking, alcohol use, drug use


Immunizations:  UTD


Reviewed Nursing Documentation:  PMH: Agreed, PSxH: Agreed





Nursing Documentation-PMH


Past Medical History:  No Stated History


Social History:  Reports: smoking - social, alcohol use - social, Denies: drug 

use, other





Review of Systems


All Other Systems:  negative except mentioned in HPI





Physical Exam





Vital Signs








  Date Time  Temp Pulse Resp B/P (MAP) Pulse Ox O2 Delivery O2 Flow Rate FiO2


 


2/7/18 00:10 98.4 69 19 146/82 96 Room Air  








Sp02 EP Interpretation:  reviewed, normal


Labs





Laboratory Tests








Test


  2/7/18


00:50


 


White Blood Count


  11.7 K/UL


(4.8-10.8)  H


 


Red Blood Count


  5.49 M/UL


(4.70-6.10)


 


Hemoglobin


  16.8 G/DL


(14.2-18.0)


 


Hematocrit


  47.2 %


(42.0-52.0)


 


Mean Corpuscular Volume 86 FL (80-99)  


 


Mean Corpuscular Hemoglobin


  30.6 PG


(27.0-31.0)


 


Mean Corpuscular Hemoglobin


Concent 35.7 G/DL


(32.0-36.0)


 


Red Cell Distribution Width


  11.4 %


(11.6-14.8)  L


 


Platelet Count


  214 K/UL


(150-450)


 


Mean Platelet Volume


  9.1 FL


(6.5-10.1)


 


Neutrophils (%) (Auto)


  77.5 %


(45.0-75.0)  H


 


Lymphocytes (%) (Auto)


  12.0 %


(20.0-45.0)  L


 


Monocytes (%) (Auto)


  10.0 %


(1.0-10.0)


 


Eosinophils (%) (Auto)


  0.0 %


(0.0-3.0)


 


Basophils (%) (Auto)


  0.5 %


(0.0-2.0)


 


Sodium Level


  140 MMOL/L


(136-145)


 


Potassium Level


  3.3 MMOL/L


(3.5-5.1)  L


 


Chloride Level


  101 MMOL/L


()


 


Carbon Dioxide Level


  31 MMOL/L


(21-32)


 


Anion Gap


  8 mmol/L


(5-15)


 


Blood Urea Nitrogen


  7 mg/dL (7-18)


 


 


Creatinine


  1.0 MG/DL


(0.55-1.30)


 


Estimat Glomerular Filtration


Rate > 60 mL/min


(>60)


 


Glucose Level


  133 MG/DL


()  H


 


Calcium Level


  9.2 MG/DL


(8.5-10.1)


 


Total Bilirubin


  0.5 MG/DL


(0.2-1.0)


 


Aspartate Amino Transf


(AST/SGOT) 24 U/L (15-37)


 


 


Alanine Aminotransferase


(ALT/SGPT) 60 U/L (12-78)


 


 


Alkaline Phosphatase


  77 U/L


()


 


Total Protein


  7.8 G/DL


(6.4-8.2)


 


Albumin


  4.3 G/DL


(3.4-5.0)


 


Globulin 3.5 g/dL  


 


Albumin/Globulin Ratio 1.2 (1.0-2.7)  


 


Lipase


  94 U/L


()








General Appearance:  well appearing, no apparent distress, alert


Head:  normocephalic


EENT:  PERRL/EOMI, normal ENT inspection


Neck:  supple


Respiratory:  normal breath sounds, no respiratory distress


Cardiovascular:  normal rate


Gastrointestinal:  normal inspection, non tender, soft, normal bowel sounds, non

-distended


Rectal:  deferred


Genitourinary:  deferred


Musculoskeletal:  normal inspection, back normal


Neurologic:  normal inspection, alert, oriented x3, responsive


Psychiatric:  normal inspection, judgement/insight normal, memory normal


Skin:  normal inspection, normal color, no rash, warm/dry, palpation normal, 

well hydrated


Lymphatic:  normal inspection, no adenopathy


Current Medications





Current Medications








 Medications


  (Trade)  Dose


 Ordered  Sig/Anali


 Route


 PRN Reason  Start Time


 Stop Time Status Last Admin


Dose Admin


 


 Acetaminophen


  (Tylenol)  650 mg  Q4H  PRN


 ORAL


 fever  2/7/18 05:45


 3/9/18 05:44   


 


 


 Al Hydroxide/Mg


 Hydroxide


  (Mylanta II)  30 ml  Q6H  PRN


 ORAL


 dyspepsia  2/7/18 05:45


 3/9/18 05:44   


 


 


 Dextrose


  (Dextrose 50%)    STAT  PRN


 IV


 Hypoglycemia  2/7/18 05:45


 3/9/18 05:44   


 


 


 Dextrose/Sodium


 Chloride  1,000 ml @ 


 75 mls/hr  [id number]


 IV


   2/7/18 05:34


 3/9/18 05:33  2/7/18 06:18


 


 


 Diphenhydramine


 HCl


  (Benadryl)  25 mg  Q6H  PRN


 ORAL


 Itching/Pruritis  2/7/18 05:45


 3/9/18 05:44   


 


 


 Heparin Sodium


  (Porcine)


  (Heparin 5000


 units/ml)  5,000 units  EVERY 12  HOURS


 SUBQ


   2/7/18 09:00


 3/9/18 08:59  2/7/18 08:31


 


 


 Morphine Sulfate


  (Morphine


 Sulfate)  2 mg  EVERY 4 HOURS  PRN


 IVP


 severe  Pain (Pain Scale 7-10)  2/7/18 05:45


 2/14/18 05:44  2/7/18 13:43


 


 


 Nitroglycerin


  (Ntg)  0.4 mg  Q5M X 3 DOSES PRN


 SL


 Prn Chest Pain  2/7/18 05:45


 3/9/18 05:44   


 


 


 Ondansetron HCl


  (Zofran)  4 mg  Q6H  PRN


 IVP


 Nausea & Vomiting  2/7/18 05:45


 3/9/18 05:44   


 


 


 Pantoprazole


  (Protonix)  40 mg  DAILY


 IVP


   2/7/18 09:00


 3/9/18 08:59  2/7/18 08:26


 


 


 Polyethylene


 Glycol


  (Miralax)  17 gm  HSPRN  PRN


 ORAL


 Constipation  2/7/18 05:45


 3/9/18 05:44   


 


 


 Temazepam


  (Restoril)  15 mg  HSPRN  PRN


 ORAL


 Insomnia  2/7/18 05:45


 2/14/18 05:44   


 











GI: Plan


Problems:  


(1) GERD (gastroesophageal reflux disease)


(2) Cholelithiasis


(3) Biliary colic


(4) Abdominal pain


Plan


CT AP & Abdominal U/S reviewed, see full report >> cholelithiasis 





fu surgical recs >> HIDA scan r/o cholecystitis 


okay to advance diet after procedure


ppi, will add H2B qhs


pain mgmt


electrolyte replacement


fu labs





Discussed with Dr. Jaime.


Thank you for this patient referral, we will follow.











June Mccarty N.P. Feb 7, 2018 15:02

## 2018-02-08 VITALS — DIASTOLIC BLOOD PRESSURE: 61 MMHG | SYSTOLIC BLOOD PRESSURE: 118 MMHG

## 2018-02-08 VITALS — SYSTOLIC BLOOD PRESSURE: 121 MMHG | DIASTOLIC BLOOD PRESSURE: 72 MMHG

## 2018-02-08 VITALS — SYSTOLIC BLOOD PRESSURE: 118 MMHG | DIASTOLIC BLOOD PRESSURE: 75 MMHG

## 2018-02-08 VITALS — SYSTOLIC BLOOD PRESSURE: 121 MMHG | DIASTOLIC BLOOD PRESSURE: 73 MMHG

## 2018-02-08 VITALS — SYSTOLIC BLOOD PRESSURE: 121 MMHG | DIASTOLIC BLOOD PRESSURE: 76 MMHG

## 2018-02-08 LAB
ADD MANUAL DIFF: NO
ALBUMIN SERPL-MCNC: 3.9 G/DL (ref 3.4–5)
ALBUMIN/GLOB SERPL: 1 {RATIO} (ref 1–2.7)
ALP SERPL-CCNC: 66 U/L (ref 46–116)
ALT SERPL-CCNC: 48 U/L (ref 12–78)
AMYLASE SERPL-CCNC: 69 U/L (ref 25–115)
ANION GAP SERPL CALC-SCNC: 8 MMOL/L (ref 5–15)
AST SERPL-CCNC: 21 U/L (ref 15–37)
BASOPHILS NFR BLD AUTO: 0.6 % (ref 0–2)
BILIRUB SERPL-MCNC: 0.9 MG/DL (ref 0.2–1)
BUN SERPL-MCNC: 7 MG/DL (ref 7–18)
CALCIUM SERPL-MCNC: 9 MG/DL (ref 8.5–10.1)
CHLORIDE SERPL-SCNC: 102 MMOL/L (ref 98–107)
CO2 SERPL-SCNC: 27 MMOL/L (ref 21–32)
CREAT SERPL-MCNC: 0.9 MG/DL (ref 0.55–1.3)
EOSINOPHIL NFR BLD AUTO: 1.2 % (ref 0–3)
ERYTHROCYTE [DISTWIDTH] IN BLOOD BY AUTOMATED COUNT: 12 % (ref 11.6–14.8)
GLOBULIN SER-MCNC: 3.9 G/DL
HCT VFR BLD CALC: 46.3 % (ref 42–52)
HGB BLD-MCNC: 16.8 G/DL (ref 14.2–18)
LYMPHOCYTES NFR BLD AUTO: 24.7 % (ref 20–45)
MCV RBC AUTO: 88 FL (ref 80–99)
MONOCYTES NFR BLD AUTO: 11.1 % (ref 1–10)
NEUTROPHILS NFR BLD AUTO: 62.5 % (ref 45–75)
PLATELET # BLD: 181 K/UL (ref 150–450)
POTASSIUM SERPL-SCNC: 3.5 MMOL/L (ref 3.5–5.1)
RBC # BLD AUTO: 5.26 M/UL (ref 4.7–6.1)
SODIUM SERPL-SCNC: 137 MMOL/L (ref 136–145)
WBC # BLD AUTO: 6.9 K/UL (ref 4.8–10.8)

## 2018-02-08 RX ADMIN — PANTOPRAZOLE SODIUM SCH MG: 40 INJECTION, POWDER, FOR SOLUTION INTRAVENOUS at 08:28

## 2018-02-08 RX ADMIN — DEXTROSE AND SODIUM CHLORIDE SCH MLS/HR: 5; .45 INJECTION, SOLUTION INTRAVENOUS at 12:37

## 2018-02-08 RX ADMIN — HEPARIN SODIUM SCH UNITS: 5000 INJECTION INTRAVENOUS; SUBCUTANEOUS at 09:00

## 2018-02-08 NOTE — GENERAL PROGRESS NOTE
Assessment/Plan


Problem List:  


(1) Biliary colic


ICD Codes:  K80.50 - Calculus of bile duct without cholangitis or cholecystitis 

without obstruction


SNOMED:  94135930


(2) Cholelithiasis


ICD Codes:  K80.20 - Calculus of gallbladder without cholecystitis without 

obstruction


SNOMED:  421439434


(3) Abdominal pain


ICD Codes:  R10.9 - Unspecified abdominal pain


SNOMED:  60233050


Qualifiers:  


   Qualified Codes:  R10.13 - Epigastric pain


Status:  stable, progressing, tolerating diet


Assessment/Plan


abx didt cbc bmp am dc if clear





Subjective


Constitutional:  Reports: weakness


Allergies:  


Coded Allergies:  


     No Known Allergies (Unverified , 2/6/18)


All Systems:  reviewed and negative except above


Subjective


calm in room





Objective





Last 24 Hour Vital Signs








  Date Time  Temp Pulse Resp B/P (MAP) Pulse Ox O2 Delivery O2 Flow Rate FiO2


 


2/8/18 12:00 97.9 58 20 121/72 98   


 


2/8/18 08:00 97.9 62 20 118/75 96   


 


2/8/18 04:00     98 Room Air  


 


2/8/18 04:00 97.9 59 18 121/76 98 Room Air  


 


2/8/18 00:00 98.1 60 20 118/61 98 Room Air  


 


2/8/18 00:00     98 Room Air  


 


2/7/18 20:00 98.4 66 18 116/65 98   


 


2/7/18 20:00     98 Room Air  


 


2/7/18 16:00 98.2 75 18 118/69 96   


 


2/7/18 14:13 97.9       

















Intake and Output  


 


 2/7/18 2/8/18





 19:00 07:00


 


Intake Total 550 ml 775 ml


 


Balance 550 ml 775 ml


 


  


 


IV Total 550 ml 775 ml


 


# Voids  2


 


# Bowel Movements  1








Laboratory Tests


2/8/18 05:40: 


White Blood Count 6.9, Red Blood Count 5.26, Hemoglobin 16.8, Hematocrit 46.3, 

Mean Corpuscular Volume 88, Mean Corpuscular Hemoglobin 31.9H, Mean Corpuscular 

Hemoglobin Concent 36.2H, Red Cell Distribution Width 12.0, Platelet Count 181, 

Mean Platelet Volume 8.7, Neutrophils (%) (Auto) 62.5, Lymphocytes (%) (Auto) 

24.7, Monocytes (%) (Auto) 11.1H, Eosinophils (%) (Auto) 1.2, Basophils (%) (

Auto) 0.6, Activated Partial Thromboplast Time 32, Sodium Level 137, Potassium 

Level 3.5, Chloride Level 102, Carbon Dioxide Level 27, Anion Gap 8, Blood Urea 

Nitrogen 7, Creatinine 0.9, Estimat Glomerular Filtration Rate > 60, Glucose 

Level 96, Calcium Level 9.0, Total Bilirubin 0.9, Aspartate Amino Transf (AST/

SGOT) 21, Alanine Aminotransferase (ALT/SGPT) 48, Alkaline Phosphatase 66, 

Total Protein 7.8, Albumin 3.9, Globulin 3.9, Albumin/Globulin Ratio 1.0, 

Amylase Level 69, Lipase 140


Height (Feet):  5


Height (Inches):  7.00


Weight (Pounds):  175


General Appearance:  alert


EENT:  PERRL/EOMI


Neck:  non-tender, normal alignment, supple


Cardiovascular:  normal peripheral pulses, normal rate, regular rhythm


Respiratory/Chest:  chest wall non-tender, lungs clear, normal breath sounds


Abdomen:  normal bowel sounds, non tender, soft


Extremities:  normal inspection


Edema:  no edema noted Arm (L), no edema noted Arm (R), no edema noted Leg (L), 

no edema noted Leg (R), no edema noted Pedal (L), no edema noted Pedal (R), no 

edema noted Generalized


Neurologic:  responsive, motor weakness


Skin:  normal pigmentation, warm/dry











REBECCA VARMA Feb 8, 2018 13:46

## 2018-02-08 NOTE — GENERAL SURGERY PROGRESS NOTE
General Surgery-Progress Note


Subjective


Symptoms:  improved, pain absent, tolerating diet, voiding well, passing flatus


Additional Comments


doing well.  no pain.  no n/v/f/c.  comfortable.  no complaints.  tolerated 

diet.  no symptoms.





Objective





Last 24 Hour Vital Signs








  Date Time  Temp Pulse Resp B/P (MAP) Pulse Ox O2 Delivery O2 Flow Rate FiO2


 


2/8/18 16:00 98.2 68 18 121/73 98   


 


2/8/18 12:00 97.9 58 20 121/72 98   


 


2/8/18 08:00 97.9 62 20 118/75 96   


 


2/8/18 04:00     98 Room Air  


 


2/8/18 04:00 97.9 59 18 121/76 98 Room Air  


 


2/8/18 00:00 98.1 60 20 118/61 98 Room Air  


 


2/8/18 00:00     98 Room Air  


 


2/7/18 20:00 98.4 66 18 116/65 98   


 


2/7/18 20:00     98 Room Air  








I&O











Intake and Output  


 


 2/7/18 2/8/18





 19:00 07:00


 


Intake Total 550 ml 775 ml


 


Balance 550 ml 775 ml


 


  


 


IV Total 550 ml 775 ml


 


# Voids  2


 


# Bowel Movements  1








Cardiovascular:  RSR


Respiratory:  clear


Abdomen:  soft, non-tender, present bowel sounds


Extremities:  no edema, no tenderness





Laboratory Tests








Test


  2/8/18


05:40


 


White Blood Count


  6.9 K/UL


(4.8-10.8)


 


Red Blood Count


  5.26 M/UL


(4.70-6.10)


 


Hemoglobin


  16.8 G/DL


(14.2-18.0)


 


Hematocrit


  46.3 %


(42.0-52.0)


 


Mean Corpuscular Volume 88 FL (80-99)  


 


Mean Corpuscular Hemoglobin


  31.9 PG


(27.0-31.0)  H


 


Mean Corpuscular Hemoglobin


Concent 36.2 G/DL


(32.0-36.0)  H


 


Red Cell Distribution Width


  12.0 %


(11.6-14.8)


 


Platelet Count


  181 K/UL


(150-450)


 


Mean Platelet Volume


  8.7 FL


(6.5-10.1)


 


Neutrophils (%) (Auto)


  62.5 %


(45.0-75.0)


 


Lymphocytes (%) (Auto)


  24.7 %


(20.0-45.0)


 


Monocytes (%) (Auto)


  11.1 %


(1.0-10.0)  H


 


Eosinophils (%) (Auto)


  1.2 %


(0.0-3.0)


 


Basophils (%) (Auto)


  0.6 %


(0.0-2.0)


 


Activated Partial


Thromboplast Time 32 SEC (23-33)


 


 


Sodium Level


  137 MMOL/L


(136-145)


 


Potassium Level


  3.5 MMOL/L


(3.5-5.1)


 


Chloride Level


  102 MMOL/L


()


 


Carbon Dioxide Level


  27 MMOL/L


(21-32)


 


Anion Gap


  8 mmol/L


(5-15)


 


Blood Urea Nitrogen


  7 mg/dL (7-18)


 


 


Creatinine


  0.9 MG/DL


(0.55-1.30)


 


Estimat Glomerular Filtration


Rate > 60 mL/min


(>60)


 


Glucose Level


  96 MG/DL


()


 


Calcium Level


  9.0 MG/DL


(8.5-10.1)


 


Total Bilirubin


  0.9 MG/DL


(0.2-1.0)


 


Aspartate Amino Transf


(AST/SGOT) 21 U/L (15-37)


 


 


Alanine Aminotransferase


(ALT/SGPT) 48 U/L (12-78)


 


 


Alkaline Phosphatase


  66 U/L


()


 


Total Protein


  7.8 G/DL


(6.4-8.2)


 


Albumin


  3.9 G/DL


(3.4-5.0)


 


Globulin 3.9 g/dL  


 


Albumin/Globulin Ratio 1.0 (1.0-2.7)  


 


Amylase Level


  69 U/L


()


 


Lipase


  140 U/L


()











Plan


Problems:  


(1) Abdominal pain


Assessment & Plan:  37M with abdominal pain.  Feels as if mainly epigastric and 

LUQ.  no radiation.  intermittent for the past two days.  currently resolved.  

Afebrile, HD stable, labs okay.  CT with stones.  US with stones.  Exam benign.

  Left upper quadrant pain resolved on admission.  HIDA positive. 





CT, ultrasound, and HIDA findings discussed with patient.  clinical history, 

course, and exam discussed with patient.  He presented with LEFT upper quadrant 

pain which resolved soon after.  He has NEVER had Right upper quadrant pain 

prior and no pain with meals or signs/symptoms of biliary pathology.  

gallstones likely incidental finding during this admission and not related to 

presenting condition. 





discussed cholelithiasis and possible outcomes.  discussed cholecystectomy risks

, benefits, and alternatives.  After long discussion decision was made for 

patient to be discharged home today.  recommend cholecystectomy to patient but 

can be done elective and he is okay with that.  can follow up with me, pcp, or 

surgeon as desired.  if any returning symptoms will return for evaluation. 





okay to d/c from surgical standpoint 














Lukas Greene Feb 8, 2018 16:29

## 2018-02-08 NOTE — GI PROGRESS NOTE
Assessment/Plan


Problems:  


(1) GERD (gastroesophageal reflux disease)


ICD Codes:  K21.9 - Gastro-esophageal reflux disease without esophagitis


SNOMED:  673286039


(2) Biliary colic


ICD Codes:  K80.50 - Calculus of bile duct without cholangitis or cholecystitis 

without obstruction


SNOMED:  27823628


(3) Cholelithiasis


ICD Codes:  K80.20 - Calculus of gallbladder without cholecystitis without 

obstruction


SNOMED:  377616711


(4) Abdominal pain


ICD Codes:  R10.9 - Unspecified abdominal pain


SNOMED:  89551977


Qualifiers:  


   Qualified Codes:  R10.13 - Epigastric pain


Status:  stable, unchanged


Status Narrative


Discussed with Dr. Jaime.


Assessment/Plan


CT AP & Abdominal U/S reviewed, see full report >> cholelithiasis 


HIDA scan positive





fu surgical recs 


pt indecisive about surgery


ppi


pain mgmt


electrolyte replacement


fu labs





Subjective


Gastrointestinal/Abdominal:  Reports: no symptoms


Subjective


tolerating clear diet





Objective





Last 24 Hour Vital Signs








  Date Time  Temp Pulse Resp B/P (MAP) Pulse Ox O2 Delivery O2 Flow Rate FiO2


 


2/8/18 08:00 97.9 62 20 118/75 96   


 


2/8/18 04:00     98 Room Air  


 


2/8/18 04:00 97.9 59 18 121/76 98 Room Air  


 


2/8/18 00:00 98.1 60 20 118/61 98 Room Air  


 


2/8/18 00:00     98 Room Air  


 


2/7/18 20:00 98.4 66 18 116/65 98   


 


2/7/18 20:00     98 Room Air  


 


2/7/18 16:00 98.2 75 18 118/69 96   


 


2/7/18 14:13 97.9       


 


2/7/18 11:37 97.9 56 19 118/67 98   

















Intake and Output  


 


 2/7/18 2/8/18





 19:00 07:00


 


Intake Total 550 ml 775 ml


 


Balance 550 ml 775 ml


 


  


 


IV Total 550 ml 775 ml


 


# Voids  2


 


# Bowel Movements  1











Laboratory Tests








Test


  2/8/18


05:40


 


White Blood Count


  6.9 K/UL


(4.8-10.8)


 


Red Blood Count


  5.26 M/UL


(4.70-6.10)


 


Hemoglobin


  16.8 G/DL


(14.2-18.0)


 


Hematocrit


  46.3 %


(42.0-52.0)


 


Mean Corpuscular Volume 88 FL (80-99)  


 


Mean Corpuscular Hemoglobin


  31.9 PG


(27.0-31.0)  H


 


Mean Corpuscular Hemoglobin


Concent 36.2 G/DL


(32.0-36.0)  H


 


Red Cell Distribution Width


  12.0 %


(11.6-14.8)


 


Platelet Count


  181 K/UL


(150-450)


 


Mean Platelet Volume


  8.7 FL


(6.5-10.1)


 


Neutrophils (%) (Auto)


  62.5 %


(45.0-75.0)


 


Lymphocytes (%) (Auto)


  24.7 %


(20.0-45.0)


 


Monocytes (%) (Auto)


  11.1 %


(1.0-10.0)  H


 


Eosinophils (%) (Auto)


  1.2 %


(0.0-3.0)


 


Basophils (%) (Auto)


  0.6 %


(0.0-2.0)


 


Activated Partial


Thromboplast Time 32 SEC (23-33)


 


 


Sodium Level


  137 MMOL/L


(136-145)


 


Potassium Level


  3.5 MMOL/L


(3.5-5.1)


 


Chloride Level


  102 MMOL/L


()


 


Carbon Dioxide Level


  27 MMOL/L


(21-32)


 


Anion Gap


  8 mmol/L


(5-15)


 


Blood Urea Nitrogen


  7 mg/dL (7-18)


 


 


Creatinine


  0.9 MG/DL


(0.55-1.30)


 


Estimat Glomerular Filtration


Rate > 60 mL/min


(>60)


 


Glucose Level


  96 MG/DL


()


 


Calcium Level


  9.0 MG/DL


(8.5-10.1)


 


Total Bilirubin


  0.9 MG/DL


(0.2-1.0)


 


Aspartate Amino Transf


(AST/SGOT) 21 U/L (15-37)


 


 


Alanine Aminotransferase


(ALT/SGPT) 48 U/L (12-78)


 


 


Alkaline Phosphatase


  66 U/L


()


 


Total Protein


  7.8 G/DL


(6.4-8.2)


 


Albumin


  3.9 G/DL


(3.4-5.0)


 


Globulin 3.9 g/dL  


 


Albumin/Globulin Ratio 1.0 (1.0-2.7)  


 


Amylase Level


  69 U/L


()


 


Lipase


  140 U/L


()








Height (Feet):  5


Height (Inches):  7.00


Weight (Pounds):  175


General Appearance:  WD/WN, no apparent distress, alert


Cardiovascular:  normal rate


Respiratory/Chest:  normal breath sounds, no respiratory distress


Abdominal Exam:  normal bowel sounds, non tender, soft


Extremities:  normal range of motion, non-tender











June Mccarty N.P. Feb 8, 2018 11:27

## 2018-02-09 NOTE — DISCHARGE SUMMARY
Discharge Summary


Hospital Course


Date of Admission


Feb 7, 2018 at 02:40


Date of Discharge


Feb 8, 2018 at 17:40


Admitting Diagnosis


intractable abdominal pain


YUNG Vargas is a 37 year old male who was admitted on Feb 7, 2018 at 02:40 

for Intractable Abdominal Pain


Hospital Course


[id number]





Discharge


Discharge Disposition


Patient was discharged to Home (01)


Discharge Diagnoses:  











Felisha Goodman NP Feb 9, 2018 10:19

## 2018-02-10 ENCOUNTER — HOSPITAL ENCOUNTER (INPATIENT)
Dept: HOSPITAL 72 - EMR | Age: 38
LOS: 3 days | Discharge: HOME | DRG: 263 | End: 2018-02-13
Payer: MEDICAID

## 2018-02-10 VITALS — DIASTOLIC BLOOD PRESSURE: 64 MMHG | SYSTOLIC BLOOD PRESSURE: 114 MMHG

## 2018-02-10 VITALS — BODY MASS INDEX: 26.68 KG/M2 | HEIGHT: 67 IN | WEIGHT: 170 LBS

## 2018-02-10 VITALS — DIASTOLIC BLOOD PRESSURE: 62 MMHG | SYSTOLIC BLOOD PRESSURE: 118 MMHG

## 2018-02-10 VITALS — SYSTOLIC BLOOD PRESSURE: 121 MMHG | DIASTOLIC BLOOD PRESSURE: 68 MMHG

## 2018-02-10 VITALS — DIASTOLIC BLOOD PRESSURE: 78 MMHG | SYSTOLIC BLOOD PRESSURE: 120 MMHG

## 2018-02-10 DIAGNOSIS — K80.00: Primary | ICD-10-CM

## 2018-02-10 DIAGNOSIS — K21.9: ICD-10-CM

## 2018-02-10 LAB
ADD MANUAL DIFF: NO
ALBUMIN SERPL-MCNC: 3.8 G/DL (ref 3.4–5)
ALBUMIN/GLOB SERPL: 1 {RATIO} (ref 1–2.7)
ALP SERPL-CCNC: 71 U/L (ref 46–116)
ALT SERPL-CCNC: 46 U/L (ref 12–78)
ANION GAP SERPL CALC-SCNC: 6 MMOL/L (ref 5–15)
APPEARANCE UR: CLEAR
APTT PPP: (no result) S
AST SERPL-CCNC: 20 U/L (ref 15–37)
BASOPHILS NFR BLD AUTO: 0.8 % (ref 0–2)
BILIRUB SERPL-MCNC: 0.6 MG/DL (ref 0.2–1)
BUN SERPL-MCNC: 12 MG/DL (ref 7–18)
CALCIUM SERPL-MCNC: 9.4 MG/DL (ref 8.5–10.1)
CHLORIDE SERPL-SCNC: 101 MMOL/L (ref 98–107)
CO2 SERPL-SCNC: 30 MMOL/L (ref 21–32)
CREAT SERPL-MCNC: 1 MG/DL (ref 0.55–1.3)
EOSINOPHIL NFR BLD AUTO: 4.1 % (ref 0–3)
ERYTHROCYTE [DISTWIDTH] IN BLOOD BY AUTOMATED COUNT: 11.7 % (ref 11.6–14.8)
GLOBULIN SER-MCNC: 3.9 G/DL
GLUCOSE UR STRIP-MCNC: NEGATIVE MG/DL
HCT VFR BLD CALC: 46.6 % (ref 42–52)
HGB BLD-MCNC: 16.1 G/DL (ref 14.2–18)
KETONES UR QL STRIP: NEGATIVE
LEUKOCYTE ESTERASE UR QL STRIP: NEGATIVE
LYMPHOCYTES NFR BLD AUTO: 19.3 % (ref 20–45)
MCV RBC AUTO: 88 FL (ref 80–99)
MONOCYTES NFR BLD AUTO: 9.5 % (ref 1–10)
NEUTROPHILS NFR BLD AUTO: 66.4 % (ref 45–75)
NITRITE UR QL STRIP: NEGATIVE
PH UR STRIP: 7 [PH] (ref 4.5–8)
PLATELET # BLD: 201 K/UL (ref 150–450)
POTASSIUM SERPL-SCNC: 3.9 MMOL/L (ref 3.5–5.1)
PROT UR QL STRIP: NEGATIVE
RBC # BLD AUTO: 5.32 M/UL (ref 4.7–6.1)
SODIUM SERPL-SCNC: 137 MMOL/L (ref 136–145)
SP GR UR STRIP: 1 (ref 1–1.03)
UROBILINOGEN UR-MCNC: NORMAL MG/DL (ref 0–1)
WBC # BLD AUTO: 8.4 K/UL (ref 4.8–10.8)

## 2018-02-10 PROCEDURE — 94150 VITAL CAPACITY TEST: CPT

## 2018-02-10 PROCEDURE — 99285 EMERGENCY DEPT VISIT HI MDM: CPT

## 2018-02-10 PROCEDURE — 94003 VENT MGMT INPAT SUBQ DAY: CPT

## 2018-02-10 PROCEDURE — 80048 BASIC METABOLIC PNL TOTAL CA: CPT

## 2018-02-10 PROCEDURE — 87081 CULTURE SCREEN ONLY: CPT

## 2018-02-10 PROCEDURE — 81003 URINALYSIS AUTO W/O SCOPE: CPT

## 2018-02-10 PROCEDURE — 80053 COMPREHEN METABOLIC PANEL: CPT

## 2018-02-10 PROCEDURE — 85610 PROTHROMBIN TIME: CPT

## 2018-02-10 PROCEDURE — 36415 COLL VENOUS BLD VENIPUNCTURE: CPT

## 2018-02-10 PROCEDURE — 83690 ASSAY OF LIPASE: CPT

## 2018-02-10 PROCEDURE — 85730 THROMBOPLASTIN TIME PARTIAL: CPT

## 2018-02-10 PROCEDURE — 82150 ASSAY OF AMYLASE: CPT

## 2018-02-10 PROCEDURE — 85025 COMPLETE CBC W/AUTO DIFF WBC: CPT

## 2018-02-10 RX ADMIN — HEPARIN SODIUM SCH UNITS: 5000 INJECTION INTRAVENOUS; SUBCUTANEOUS at 09:12

## 2018-02-10 RX ADMIN — DEXTROSE AND SODIUM CHLORIDE SCH MLS/HR: 5; .45 INJECTION, SOLUTION INTRAVENOUS at 09:08

## 2018-02-10 RX ADMIN — HEPARIN SODIUM SCH UNITS: 5000 INJECTION INTRAVENOUS; SUBCUTANEOUS at 21:13

## 2018-02-10 RX ADMIN — DEXTROSE AND SODIUM CHLORIDE SCH MLS/HR: 5; .45 INJECTION, SOLUTION INTRAVENOUS at 21:10

## 2018-02-10 RX ADMIN — PANTOPRAZOLE SODIUM SCH MG: 40 INJECTION, POWDER, FOR SOLUTION INTRAVENOUS at 09:08

## 2018-02-10 NOTE — DISCHARGE SUMMARY 2 SIG
DATE OF ADMISSION:  02/07/2018



DATE OF DISCHARGE:  02/08/2018



CONSULTANTS:

1. Cisco Jaime M.D.

2. Lukas Greene M.D.

3. Vish Pedraza M.D.



BRIEF HOSPITAL COURSE:  The patient is a 37-year-old male, who came from

home.  He presented to ED complaining of abdominal pain for two days.  He

has no medical history.  On evaluation at ED, blood work showed mild

leukocytosis.  LFTs were normal.  Lipase was normal.  He had CT of the

abdomen and pelvis that showed cholelithiasis with gallbladder wall

thickening.  He underwent abdominal ultrasound with findings of

cholelithiasis.  No definite gallbladder wall thickening seen.  Negative

sonographic Berumen's.  Negative for dilated ducts.  Dr. Greene was

consulted.  The patient had intermittent pain for the past two days,

however, currently resolved.  He was continued on NPO status and was given

IV fluids.  HIDA scan done showed nonvisualization of the gallbladder.

The patient was offered cholecystectomy, however he is not interested in

surgery at this time.  He was given trial with oral diet and was

tolerating diet well.  He was passing flatus, voiding well, and

comfortable with no abdominal pain.  He was cleared for discharge.

Advised to follow up with PCP for an elective cholecystectomy.



FINAL DIAGNOSES:

1. Cholelithiasis.

2. Biliary colic.

3. Gastroesophageal reflux disease.



DISPOSITION:  The patient was discharged home.



DISCHARGE INSTRUCTIONS:  Follow up with PCP.  The patient was recommended

cholecystectomy, which can be done electively.







  ______________________________________________

  Sergio Rios D.O.



I have been assigned to dictate discharge summary on this account and I

was not involved in the patient's management.



  ______________________________________________

  Felisha Goodman N.P.





DR:  Shea

D:  02/09/2018 10:18

T:  02/10/2018 03:13

JOB#:  [id number]

CC:

## 2018-02-10 NOTE — CONSULTATION
History of Present Illness


General


Date patient seen:  Feb 10, 2018


Chief Complaint:  Abdominal Pain


Reason for Consultation:  bilary colic





Present Illness


HPI


37 year old male known to me from prior admission.  Presented earlier in the 

week with complaints of LEFT upper quadrant pain that resolved soon after 

admission.  During work up had normal CT which demonstrated cholelithiasis.  

Ultrasound demonstrated cholelithiasis but no cholecystitis.  HIDA positive.  

History reviewed and patient did NOT have prior symptoms of gallbladder 

disease.  No Right sided pain, no radiation to back, no abnormal labs, no 

relation to food.  Patient was discharged in stable condition and was to follow 

up as an outpatient.  





Patient returned to ED today complaining of pain again.  This time states pain 

is RUQ with radiation to right upper back.  Now states it is related or oral 

intake.  Labs normal. no n/v/f/c. Pain has resolved since admission.  He is 

requesting his gallbladder to be removed.


Allergies:  


Coded Allergies:  


     No Known Allergies (Unverified , 2/6/18)





Medication History


Scheduled


Mag Hydrox/Al Hydrox/Simeth (Maalox Maximum Strength Susp), 15 ML PO Q4HR


No Known Medications* (NKM - No Known Medications*), 0 ., (Reported)


Pantoprazole* (Protonix*), 40 MG ORAL DAILY





Scheduled PRN


Ondansetron Odt* (Zofran Odt*), 4 MG ORAL Q6H PRN for Nausea & Vomiting





Patient History


History Provided By:  Patient, Family Member


Healthcare decision maker


N


Resuscitation status





Advanced Directive on File








Past Medical/Surgical History


Past Medical/Surgical History:  


(1) Cholelithiasis


(2) Abdominal pain


(3) GERD (gastroesophageal reflux disease)


(4) Cholecystitis, acute with cholelithiasis


(5) Recurrent biliary colic





Review of Systems


Constitutional:  Denies: no symptoms, see HPI, chills, sweats, fever, malaise, 

weakness, other


Eye:  Denies: no symptoms, see HPI, eye pain, blurred vision, tearing, double 

vision, nose pain, nose congestion, acuity changes, discharge, other


ENT:  Denies: no symptoms, see HPI, ear pain, ear discharge, nose pain, nose 

congestion, throat pain, throat swelling, mouth pain, hearing loss, nasal 

discharge, other


Respiratory:  Denies: no symptoms, see HPI, cough, orthopnea, shortness of 

breath, stridor, wheezing, RANGEL, sputum, other


Cardiovascular:  Denies: no symptoms, see HPI, chest pain, edema, palpitations, 

syncope, PND, other


Gastrointestinal:  Reports: abdominal pain


Genitourinary:  Denies: no symptoms, see HPI, discharge, dysuria, frequency, 

hematuria, pain, retention, incontinence, urgency, vag bleed/dc, other


Musculoskeletal:  Denies: no symptoms, see HPI, back pain, gout, joint pain, 

joint swelling, muscle pain, muscle stiffness, other


Skin:  Denies: no symptoms, see HPI, rash, change in color, change in hair/nails

, dryness, lesions, other


Psychiatric:  Denies: no symptoms, see HPI, prior hx, anxiety, depressed 

feelings, emotional problems, SI, HI, hallucinations, other


Neurological:  Denies: no symptoms, see HPI, headache, numbness, paresthesia, 

seizure, tingling, tremors, focal weakness, syncope, dizziness, other


Endocrine:  Denies: no symptoms, see HPI, excessive sweating, flushing, 

intolerance to temperature, increased thirst, increased urine, unexplained 

weight loss, other


Hematologic/Lymphatic:  Denies: no symptoms, see HPI, anemia, blood clots, easy 

bleeding, easy bruising, swollen glands, diathesis, other





Physical Exam


General Appearance:  no apparent distress


Lines, tubes and drains:  peripheral


HEENT:  normocephalic, mucous membranes moist, PERRL


Neck:  normal inspection


Respiratory/Chest:  normal breath sounds, no respiratory distress, no accessory 

muscle use


Cardiovascular/Chest:  normal peripheral pulses, normal rate, regular rhythm


Abdomen:  normal bowel sounds, non tender, soft, no organomegaly, no mass


Extremities:  normal inspection


Skin Exam:  normal pigmentation


Neurologic:  alert, oriented x 3, responsive





Last 24 Hour Vital Signs








  Date Time  Temp Pulse Resp B/P (MAP) Pulse Ox O2 Delivery O2 Flow Rate FiO2


 


2/10/18 07:46  56 16 111/68 99 Room Air  


 


2/10/18 07:33 97.6 56 16 118/62 99 Room Air  


 


2/10/18 05:42 98.1       


 


2/10/18 04:30 98.1 65 16 120/78 99 Room Air  


 


2/10/18 04:27 98.1 65 16 120/78 99 Room Air  

















Intake and Output  


 


 2/9/18 2/10/18





 19:00 07:00


 


Intake Total  0 ml


 


Output Total  400 ml


 


Balance  -400 ml


 


  


 


Intake Oral  0 ml


 


Output Urine Total  400 ml











Laboratory Tests








Test


  2/10/18


04:55 2/10/18


05:43


 


White Blood Count


  8.4 K/UL


(4.8-10.8) 


 


 


Red Blood Count


  5.32 M/UL


(4.70-6.10) 


 


 


Hemoglobin


  16.1 G/DL


(14.2-18.0) 


 


 


Hematocrit


  46.6 %


(42.0-52.0) 


 


 


Mean Corpuscular Volume 88 FL (80-99)   


 


Mean Corpuscular Hemoglobin


  30.3 PG


(27.0-31.0) 


 


 


Mean Corpuscular Hemoglobin


Concent 34.6 G/DL


(32.0-36.0) 


 


 


Red Cell Distribution Width


  11.7 %


(11.6-14.8) 


 


 


Platelet Count


  201 K/UL


(150-450) 


 


 


Mean Platelet Volume


  8.7 FL


(6.5-10.1) 


 


 


Neutrophils (%) (Auto)


  66.4 %


(45.0-75.0) 


 


 


Lymphocytes (%) (Auto)


  19.3 %


(20.0-45.0)  L 


 


 


Monocytes (%) (Auto)


  9.5 %


(1.0-10.0) 


 


 


Eosinophils (%) (Auto)


  4.1 %


(0.0-3.0)  H 


 


 


Basophils (%) (Auto)


  0.8 %


(0.0-2.0) 


 


 


Sodium Level


  137 MMOL/L


(136-145) 


 


 


Potassium Level


  3.9 MMOL/L


(3.5-5.1) 


 


 


Chloride Level


  101 MMOL/L


() 


 


 


Carbon Dioxide Level


  30 MMOL/L


(21-32) 


 


 


Anion Gap


  6 mmol/L


(5-15) 


 


 


Blood Urea Nitrogen


  12 mg/dL


(7-18) 


 


 


Creatinine


  1.0 MG/DL


(0.55-1.30) 


 


 


Estimat Glomerular Filtration


Rate > 60 mL/min


(>60) 


 


 


Glucose Level


  108 MG/DL


()  H 


 


 


Calcium Level


  9.4 MG/DL


(8.5-10.1) 


 


 


Total Bilirubin


  0.6 MG/DL


(0.2-1.0) 


 


 


Aspartate Amino Transf


(AST/SGOT) 20 U/L (15-37)


  


 


 


Alanine Aminotransferase


(ALT/SGPT) 46 U/L (12-78)


  


 


 


Alkaline Phosphatase


  71 U/L


() 


 


 


Total Protein


  7.7 G/DL


(6.4-8.2) 


 


 


Albumin


  3.8 G/DL


(3.4-5.0) 


 


 


Globulin 3.9 g/dL   


 


Albumin/Globulin Ratio 1.0 (1.0-2.7)   


 


Lipase


  193 U/L


() 


 


 


Urine Color  Pale yellow  


 


Urine Appearance  Clear  


 


Urine pH  7 (4.5-8.0)  


 


Urine Specific Gravity


  


  1.005


(1.005-1.035)


 


Urine Protein


  


  Negative


(NEGATIVE)


 


Urine Glucose (UA)


  


  Negative


(NEGATIVE)


 


Urine Ketones


  


  Negative


(NEGATIVE)


 


Urine Occult Blood


  


  Negative


(NEGATIVE)


 


Urine Nitrite


  


  Negative


(NEGATIVE)


 


Urine Bilirubin


  


  Negative


(NEGATIVE)


 


Urine Urobilinogen


  


  Normal MG/DL


(0.0-1.0)


 


Urine Leukocyte Esterase


  


  Negative


(NEGATIVE)








Height (Feet):  5


Height (Inches):  7.00


Weight (Pounds):  170


Medications





Current Medications








 Medications


  (Trade)  Dose


 Ordered  Sig/Anali


 Route


 PRN Reason  Start Time


 Stop Time Status Last Admin


Dose Admin


 


 Acetaminophen


  (Tylenol)  650 mg  Q4H  PRN


 ORAL


 fever  2/10/18 07:45


 3/12/18 07:44   


 


 


 Al Hydroxide/Mg


 Hydroxide


  (Mylanta II)  30 ml  Q6H  PRN


 ORAL


 dyspepsia  2/10/18 07:45


 3/12/18 07:44   


 


 


 Dextrose


  (Dextrose 50%)    STAT  PRN


 IV


 Hypoglycemia  2/10/18 07:45


 3/12/18 07:44   


 


 


 Dextrose/Sodium


 Chloride  1,000 ml @ 


 75 mls/hr  [id number]


 IV


   2/10/18 09:00


 3/12/18 08:59  2/10/18 09:08


 


 


 Diphenhydramine


 HCl


  (Benadryl)  25 mg  Q6H  PRN


 ORAL


 Itching/Pruritis  2/10/18 07:45


 3/12/18 07:44   


 


 


 Famotidine


  (Pepcid)  20 mg  DAILY


 ORAL


   2/10/18 13:00


 3/12/18 12:59  2/10/18 13:27


 


 


 Heparin Sodium


  (Porcine)


  (Heparin 5000


 units/ml)  5,000 units  EVERY 12  HOURS


 SUBQ


   2/10/18 09:00


 3/12/18 08:59  2/10/18 09:12


 


 


 Morphine Sulfate


  (Morphine


 Sulfate)  2 mg  Q4H  PRN


 IVP


 severe  Pain (Pain Scale 7-10)  2/10/18 07:45


 2/17/18 07:44   


 


 


 Nitroglycerin


  (Ntg)  0.4 mg  Q5M X 3 DOSES PRN


 SL


 Prn Chest Pain  2/10/18 07:45


 3/12/18 07:44   


 


 


 Ondansetron HCl


  (Zofran)  4 mg  Q6H  PRN


 IVP


 Nausea & Vomiting  2/10/18 07:45


 3/12/18 07:44   


 


 


 Pantoprazole


  (Protonix)  40 mg  DAILY


 IVP


   2/10/18 09:00


 3/12/18 08:59  2/10/18 09:08


 


 


 Polyethylene


 Glycol


  (Miralax)  17 gm  HSPRN  PRN


 ORAL


 Constipation  2/10/18 07:45


 3/12/18 07:44   


 


 


 Temazepam


  (Restoril)  15 mg  HSPRN  PRN


 ORAL


 Insomnia  2/10/18 07:45


 2/17/18 07:44   


 











Assessment/Plan


Problem List:  


(1) Cholelithiasis


Assessment & Plan:  37 year male now presents with symptoms of biliary colic.  

Prior presented with left sided pain that resolved and did not have relation to 

food.  identified to have cholelithiasis with positive HIDA on prior admission 

but asymptomatic.  now presents with "classic" symptoms of symptomatic 

cholelithiasis; RUQ pain worsened with oral intake of fatty foods).   Afebrile, 

HD stable, labs normal. 


Discussed findings with patient and he and family are very concerned about new 

symptoms and gallbladder disease.  Given findings on prior recent admission of 

positive HIDA with cholelithiasis, cholecystectomy recommended and was 

recommended on last admission as well.  Since symptoms had resolved recommend 

elective surgery as risks, benefits, and post op are improved in the elective 

setting.  Since presenting again and now having biliary symptoms will proceed 

with cholecystectomy while in hospital.  





-will have to discuss and coordinate with OR to find time for lap vs open 

cholecystectomy


-npo


-iv fluids


-consent.  





risks, benefits, and alternatives to surgery discussed with patient in detail.  

elective vs hospital stay procedure discussed with patient in detail.  we 

discussed possibility of recurrent left sided pain and symptoms even after 

surgery as they may not be related to gallbladder.  risks of bleeding, infection

, organ injury, CBD injury, liver injury, biloma, more procedures, need for re 

operation, and conversion to open procedure discussed with patient in detail.  

he and family had many questions and I spent over 30-45 minutes in the room 

with them going over all questions and details.


ICD Codes:  K80.20 - Calculus of gallbladder without cholecystitis without 

obstruction


SNOMED:  852782143


Qualifiers:  


   Qualified Codes:  K80.20 - Calculus of gallbladder without cholecystitis 

without obstruction


Status:  stable











Lukas Greene Feb 10, 2018 14:51

## 2018-02-10 NOTE — CONSULTATION
DATE OF CONSULTATION:  02/10/2018



GASTROENTEROLOGY CONSULTATION



CONSULTING PHYSICIAN:  Kahlil Dunn M.D.



CHIEF COMPLAINT:  I was asked to see this patient by Dr. Sergio Rios for

Dr. Jaime for evaluation of cholecystitis.



HISTORY OF PRESENT ILLNESS:  The patient is a 37-year-old  man, who

has had recent onset of abdominal pain for about a week.  He says the pain

is epigastric and right upper quadrant.  There is no nausea or vomiting.

He came to the emergency room initially few days ago and had imaging

studies showing cholelithiasis.  Subsequent HIDA scan showed

nonvisualization of gallbladder.  Based on these results, he was advised

to have a cholecystectomy, but refused and he says he was feeling better

and he wanted to go home.  He went home, but his pain was recurrent and

therefore he is back in the hospital.  He feels better now since he has

been made NPO.



PAST MEDICAL HISTORY:  Otherwise unremarkable.



PAST SURGICAL HISTORY:  None.



MEDICATIONS:  See chart list for details.



ALLERGIES:  None.



MEDICATIONS:  See chart list for details.



FAMILY HISTORY:  Noncontributory.



SOCIAL HISTORY:  The patient does not smoke.  He drinks occasionally.  He

does not use drugs.



REVIEW OF SYSTEMS:  Otherwise negative.



PHYSICAL EXAMINATION:

GENERAL:  The patient is a well-developed and well-nourished  man,

seen in his room.

HEENT:  Normocephalic and atraumatic.  Sclerae anicteric.  Oropharynx

clear.

NECK:  Supple.

CHEST:  Clear to auscultation.

CARDIOVASCULAR:  Revealed a regular rate.

ABDOMEN:  Soft.  Good bowel sounds.  There is minimal tenderness to

palpation in the epigastric region.

EXTREMITIES:  Revealed no edema.

NEUROLOGIC:  Nonfocal.



LABORATORY AND DIAGNOSTIC DATA:  Laboratory data were noted.  Imaging

studies were noted.



ASSESSMENT:  This patient presents with recurrent abdominal pain with

significant cholelithiasis and an imaging study with a HIDA scan

suggestive of acute cholecystitis or at least biliary colic.  The patient

has been advised to undergo a definitive cholecystectomy and this can be

pursued on this admission.  For the time being, I will keep the patient

NPO and follow his symptoms.  He is likely to be well as long he is not

eating.  He can be placed on H2 blocker prophylaxis in the meantime.



RECOMMENDATIONS:  Per above discussion and per orders written in the chart.

 



Thank you for asking me to participate in the care of this patient.









  ______________________________________________

  Kahlil Dunn M.D.





DR:  Alivia

D:  02/10/2018 11:57

T:  02/10/2018 21:22

JOB#:  [id number]

CC:

## 2018-02-10 NOTE — CONSULTATION
History of Present Illness


General


Date patient seen:  Feb 10, 2018


Chief Complaint:  Abdominal Pain





Present Illness


HPI


 37-year-old  male with a history of gallstone with recent  possible 

cholecystitis. HIDA showed acute cholecystitis.  He was offered surgery but was 

hesitant initially.  He was able to tolerate by mouth we'll discharge home.  He 

said since his been home his been having persistent pain.  Apparently he 

changed his mind and wants to have surgery now.


Allergies:  


Coded Allergies:  


     No Known Allergies (Unverified , 2/6/18)





Medication History


Scheduled


Mag Hydrox/Al Hydrox/Simeth (Maalox Maximum Strength Susp), 15 ML PO Q4HR


No Known Medications* (NKM - No Known Medications*), 0 ., (Reported)


Pantoprazole* (Protonix*), 40 MG ORAL DAILY





Scheduled PRN


Ondansetron Odt* (Zofran Odt*), 4 MG ORAL Q6H PRN for Nausea & Vomiting





Patient History


Healthcare decision maker


N


Resuscitation status





Advanced Directive on File








Past Medical/Surgical History


Past Medical/Surgical History:  


(1) Recurrent biliary colic





Physical Exam


General Appearance:  WD/WN


Lines, tubes and drains:  peripheral


HEENT:  normocephalic, anicteric


Neck:  non-tender, normal alignment


Respiratory/Chest:  chest wall non-tender, lungs clear


Cardiovascular/Chest:  normal peripheral pulses


Abdomen:  non tender





Last 24 Hour Vital Signs








  Date Time  Temp Pulse Resp B/P (MAP) Pulse Ox O2 Delivery O2 Flow Rate FiO2


 


2/10/18 07:46  56 16 111/68 99 Room Air  


 


2/10/18 07:33 97.6 56 16 118/62 99 Room Air  


 


2/10/18 05:42 98.1       


 


2/10/18 04:30 98.1 65 16 120/78 99 Room Air  


 


2/10/18 04:27 98.1 65 16 120/78 99 Room Air  

















Intake and Output  


 


 2/9/18 2/10/18





 19:00 07:00


 


Intake Total  0 ml


 


Output Total  400 ml


 


Balance  -400 ml


 


  


 


Intake Oral  0 ml


 


Output Urine Total  400 ml











Laboratory Tests








Test


  2/10/18


04:55 2/10/18


05:43


 


White Blood Count


  8.4 K/UL


(4.8-10.8) 


 


 


Red Blood Count


  5.32 M/UL


(4.70-6.10) 


 


 


Hemoglobin


  16.1 G/DL


(14.2-18.0) 


 


 


Hematocrit


  46.6 %


(42.0-52.0) 


 


 


Mean Corpuscular Volume 88 FL (80-99)   


 


Mean Corpuscular Hemoglobin


  30.3 PG


(27.0-31.0) 


 


 


Mean Corpuscular Hemoglobin


Concent 34.6 G/DL


(32.0-36.0) 


 


 


Red Cell Distribution Width


  11.7 %


(11.6-14.8) 


 


 


Platelet Count


  201 K/UL


(150-450) 


 


 


Mean Platelet Volume


  8.7 FL


(6.5-10.1) 


 


 


Neutrophils (%) (Auto)


  66.4 %


(45.0-75.0) 


 


 


Lymphocytes (%) (Auto)


  19.3 %


(20.0-45.0)  L 


 


 


Monocytes (%) (Auto)


  9.5 %


(1.0-10.0) 


 


 


Eosinophils (%) (Auto)


  4.1 %


(0.0-3.0)  H 


 


 


Basophils (%) (Auto)


  0.8 %


(0.0-2.0) 


 


 


Sodium Level


  137 MMOL/L


(136-145) 


 


 


Potassium Level


  3.9 MMOL/L


(3.5-5.1) 


 


 


Chloride Level


  101 MMOL/L


() 


 


 


Carbon Dioxide Level


  30 MMOL/L


(21-32) 


 


 


Anion Gap


  6 mmol/L


(5-15) 


 


 


Blood Urea Nitrogen


  12 mg/dL


(7-18) 


 


 


Creatinine


  1.0 MG/DL


(0.55-1.30) 


 


 


Estimat Glomerular Filtration


Rate > 60 mL/min


(>60) 


 


 


Glucose Level


  108 MG/DL


()  H 


 


 


Calcium Level


  9.4 MG/DL


(8.5-10.1) 


 


 


Total Bilirubin


  0.6 MG/DL


(0.2-1.0) 


 


 


Aspartate Amino Transf


(AST/SGOT) 20 U/L (15-37)


  


 


 


Alanine Aminotransferase


(ALT/SGPT) 46 U/L (12-78)


  


 


 


Alkaline Phosphatase


  71 U/L


() 


 


 


Total Protein


  7.7 G/DL


(6.4-8.2) 


 


 


Albumin


  3.8 G/DL


(3.4-5.0) 


 


 


Globulin 3.9 g/dL   


 


Albumin/Globulin Ratio 1.0 (1.0-2.7)   


 


Lipase


  193 U/L


() 


 


 


Urine Color  Pale yellow  


 


Urine Appearance  Clear  


 


Urine pH  7 (4.5-8.0)  


 


Urine Specific Gravity


  


  1.005


(1.005-1.035)


 


Urine Protein


  


  Negative


(NEGATIVE)


 


Urine Glucose (UA)


  


  Negative


(NEGATIVE)


 


Urine Ketones


  


  Negative


(NEGATIVE)


 


Urine Occult Blood


  


  Negative


(NEGATIVE)


 


Urine Nitrite


  


  Negative


(NEGATIVE)


 


Urine Bilirubin


  


  Negative


(NEGATIVE)


 


Urine Urobilinogen


  


  Normal MG/DL


(0.0-1.0)


 


Urine Leukocyte Esterase


  


  Negative


(NEGATIVE)








Height (Feet):  5


Height (Inches):  7.00


Weight (Pounds):  170


Medications





Current Medications








 Medications


  (Trade)  Dose


 Ordered  Sig/Anali


 Route


 PRN Reason  Start Time


 Stop Time Status Last Admin


Dose Admin


 


 Acetaminophen


  (Tylenol)  650 mg  Q4H  PRN


 ORAL


 fever  2/10/18 07:45


 3/12/18 07:44   


 


 


 Al Hydroxide/Mg


 Hydroxide


  (Mylanta II)  30 ml  Q6H  PRN


 ORAL


 dyspepsia  2/10/18 07:45


 3/12/18 07:44   


 


 


 Dextrose


  (Dextrose 50%)    STAT  PRN


 IV


 Hypoglycemia  2/10/18 07:45


 3/12/18 07:44   


 


 


 Dextrose/Sodium


 Chloride  1,000 ml @ 


 75 mls/hr  [id number]


 IV


   2/10/18 09:00


 3/12/18 08:59  2/10/18 09:08


 


 


 Diphenhydramine


 HCl


  (Benadryl)  25 mg  Q6H  PRN


 ORAL


 Itching/Pruritis  2/10/18 07:45


 3/12/18 07:44   


 


 


 Heparin Sodium


  (Porcine)


  (Heparin 5000


 units/ml)  5,000 units  EVERY 12  HOURS


 SUBQ


   2/10/18 09:00


 3/12/18 08:59  2/10/18 09:12


 


 


 Morphine Sulfate


  (Morphine


 Sulfate)  2 mg  Q4H  PRN


 IVP


 severe  Pain (Pain Scale 7-10)  2/10/18 07:45


 2/17/18 07:44   


 


 


 Nitroglycerin


  (Ntg)  0.4 mg  Q5M X 3 DOSES PRN


 SL


 Prn Chest Pain  2/10/18 07:45


 3/12/18 07:44   


 


 


 Ondansetron HCl


  (Zofran)  4 mg  Q6H  PRN


 IVP


 Nausea & Vomiting  2/10/18 07:45


 3/12/18 07:44   


 


 


 Pantoprazole


  (Protonix)  40 mg  DAILY


 IVP


   2/10/18 09:00


 3/12/18 08:59  2/10/18 09:08


 


 


 Polyethylene


 Glycol


  (Miralax)  17 gm  HSPRN  PRN


 ORAL


 Constipation  2/10/18 07:45


 3/12/18 07:44   


 


 


 Temazepam


  (Restoril)  15 mg  HSPRN  PRN


 ORAL


 Insomnia  2/10/18 07:45


 2/17/18 07:44   


 











Assessment/Plan


Problem List:  


(1) Cholecystitis, acute with cholelithiasis


ICD Codes:  K80.00 - Calculus of gallbladder with acute cholecystitis without 

obstruction


SNOMED:  91961342


Qualifiers:  


   Qualified Codes:  K80.00 - Calculus of gallbladder with acute cholecystitis 

without obstruction


(2) Recurrent biliary colic


ICD Codes:  K80.50 - Calculus of bile duct without cholangitis or cholecystitis 

without obstruction


SNOMED:  72327882


Assessment/Plan


npi


gi/surgery evaluation again


IV fluids


symptomatic treatment











HESHAM RUBIO Feb 10, 2018 10:51

## 2018-02-10 NOTE — HISTORY AND PHYSICAL REPORT
DATE OF ADMISSION:  02/10/2018



APPROXIMATE TIME:  9 a.m.



CONSULTANTS:

1. Cisco Jaime M.D.

2. Vish Pedraza M.D.

3. Lukas Greene M.D.



CHIEF COMPLAINT:  Abdominal pain, nausea, vomiting, and recurrent biliary

colic.



BRIEF HISTORY:  The patient is a 37-year-old male came in about four days

ago with history of abdominal pain, nausea, vomiting, diagnosed with

cholecystitis.  The patient improved after two days, felt well and wanted

to go home.  The patient was discharged, was eating well at the time.

Apparently yesterday, the patient became more nauseous and vomited and has

some abdominal pain recurrent.  The patient came into [facility] last night,

diagnosed with the above, will be admitted to medical floor for further

treatment.  Currently, sleeping in bed, slight abdominal pain.  No

complaint.



REVIEW OF SYSTEMS:  No chest pain.  No shortness of breath.  Slight nausea

and vomiting.  No diarrhea.



PAST MEDICAL HISTORY:  Cholecystitis and nothing else.



PAST SURGICAL HISTORY:  None.



MEDICATIONS:  Include heparin, Protonix, Tylenol, morphine, MiraLAX,

Zofran, Restoril, Benadryl, Mylanta, and nitroglycerin.



ALLERGIES:  Denies.



SOCIAL HISTORY:  No smoking.  Positive alcohol.  No intravenous drug

abuse.



FAMILY HISTORY:  Noncontributory.



PHYSICAL EXAMINATION:

GENERAL:  Slightly anxious in bed, oriented x3, no acute distress.

VITAL SIGNS:  Temperature is 97, pulse 56, respiratory rate 16, and blood

pressure 111/68.

CARDIOVASCULAR:  No murmur.

LUNGS:  Distant and clear.

ABDOMEN:  Bowel sound positive.  Slightly tender.  No guarding.  No

rigidity.  No rebound.  Soft.

EXTREMITIES:  No cyanosis or edema.

NEUROLOGICAL:  Cranial nerves II to XII grossly intact.  Deep tendon reflex

2+/4.  Muscle strength 5/5.



LABORATORY AND DIAGNOSTIC DATA:  CBC is normal.  BMP showed glucose 108,

otherwise urinalysis is negative.



ASSESSMENT:  Recurrent biliary colic, cholecystitis.



PLAN:  Continue premedications.  GI followup and Surgery followup.

Antibiotics p.r.n.



Dr. Pedraza, Dr. Jaime, and Dr. Greene to follow up.  CBC, BMP in

the morning.  We will continue to follow the patient.









  ______________________________________________

  Sergio Rios D.O.





DR:  RYANNE

D:  02/10/2018 08:48

T:  02/10/2018 18:28

JOB#:  [id number]

CC:

## 2018-02-10 NOTE — EMERGENCY ROOM REPORT
History of Present Illness


General


Chief Complaint:  Abdominal Pain


Source:  Patient





Present Illness


HPI


Is a 37-year-old  male with a history of gallstone.  He was just 

admitted here with possible cholecystitis.  Ultrasound show cholelithiasis.  

HIDA showed acute cholecystitis.  He was offered surgery but was hesitant 

initially.  He was able to tolerate by mouth we'll discharge home.  He said 

since his been home his been having persistent pain.  Pain going to his back.  

Every time he eats, he will get more pain.  No fever or chills.  No jaundice.  

As 8/10.  No left upper quadrant pain.


Allergies:  


Coded Allergies:  


     No Known Allergies (Unverified , 2/6/18)





Patient History


Past Medical History:  see triage record, old chart reviewed


Past Surgical History:  other


Pertinent Family History:  none


Social History:  Reports: smoking


Immunizations:  other


Reviewed Nursing Documentation:  PMH: Agreed, PSxH: Agreed





Nursing Documentation-[facility]


Past Medical History:  No History, Except For


Hx Gastrointestinal Problems:  Yes - Gallstones





Review of Systems


Eye:  Denies: eye pain, blurred vision


ENT:  Denies: ear pain, nose congestion, throat swelling


Respiratory:  Denies: cough, shortness of breath


Cardiovascular:  Denies: chest pain, palpitations


Gastrointestinal:  Reports: abdominal pain, Denies: diarrhea, nausea, vomiting


Musculoskeletal:  Denies: back pain, joint pain


Skin:  Denies: rash


Neurological:  Denies: headache, numbness


Endocrine:  Denies: increased thirst, increased urine


Hematologic/Lymphatic:  Denies: easy bruising


All Other Systems:  negative except mentioned in HPI





Physical Exam





Vital Signs








  Date Time  Temp Pulse Resp B/P (MAP) Pulse Ox O2 Delivery O2 Flow Rate FiO2


 


2/10/18 04:27 98.1 65 16 120/78 99 Room Air  





vitals normal


Sp02 EP Interpretation:  reviewed, normal


General Appearance:  well appearing, no apparent distress, alert


Head:  normocephalic, atraumatic


Eyes:  bilateral eye PERRL, bilateral eye EOMI


ENT:  hearing grossly normal, normal pharynx


Neck:  full range of motion, supple, no meningismus


Respiratory:  chest non-tender, lungs clear, normal breath sounds


Cardiovascular #1:  regular rate, rhythm, no murmur


Gastrointestinal:  normal bowel sounds, no mass, no organomegaly, no bruit, non-

distended, tenderness - Right upper quadrant


Musculoskeletal:  back normal, gait/station normal, normal range of motion


Psychiatric:  mood/affect normal


Skin:  warm/dry





Medical Decision Making


Diagnostic Impression:  


 Primary Impression:  


 Recurrent biliary colic


 Additional Impression:  


 Cholecystitis, acute with cholelithiasis


 Qualified Codes:  K80.00 - Calculus of gallbladder with acute cholecystitis 

without obstruction


ER Course


Patient with recurrent biliary colic with possible cholecystitis based on a 

HIDA scan.  Will admit for surgical evaluation.





Last Vital Signs








  Date Time  Temp Pulse Resp B/P (MAP) Pulse Ox O2 Delivery O2 Flow Rate FiO2


 


2/10/18 04:27 98.1 65 16 120/78 99 Room Air  








Status:  improved


Disposition:  ADMITTED AS INPATIENT


Condition:  Serious


Referrals:  


NOT CHOSEN IPA/MD,REFERRING (PCP)











ANJELICA DAWKINS M.D. Feb 10, 2018 05:26

## 2018-02-11 VITALS — DIASTOLIC BLOOD PRESSURE: 60 MMHG | SYSTOLIC BLOOD PRESSURE: 113 MMHG

## 2018-02-11 VITALS — DIASTOLIC BLOOD PRESSURE: 64 MMHG | SYSTOLIC BLOOD PRESSURE: 101 MMHG

## 2018-02-11 VITALS — DIASTOLIC BLOOD PRESSURE: 59 MMHG | SYSTOLIC BLOOD PRESSURE: 110 MMHG

## 2018-02-11 VITALS — SYSTOLIC BLOOD PRESSURE: 100 MMHG | DIASTOLIC BLOOD PRESSURE: 62 MMHG

## 2018-02-11 VITALS — SYSTOLIC BLOOD PRESSURE: 101 MMHG | DIASTOLIC BLOOD PRESSURE: 51 MMHG

## 2018-02-11 LAB
ADD MANUAL DIFF: NO
ALBUMIN SERPL-MCNC: 3.5 G/DL (ref 3.4–5)
ALBUMIN/GLOB SERPL: 0.9 {RATIO} (ref 1–2.7)
ALP SERPL-CCNC: 68 U/L (ref 46–116)
ALT SERPL-CCNC: 45 U/L (ref 12–78)
AMYLASE SERPL-CCNC: 77 U/L (ref 25–115)
ANION GAP SERPL CALC-SCNC: 6 MMOL/L (ref 5–15)
AST SERPL-CCNC: 20 U/L (ref 15–37)
BASOPHILS NFR BLD AUTO: 2.3 % (ref 0–2)
BILIRUB SERPL-MCNC: 0.8 MG/DL (ref 0.2–1)
BUN SERPL-MCNC: 11 MG/DL (ref 7–18)
CALCIUM SERPL-MCNC: 9.3 MG/DL (ref 8.5–10.1)
CHLORIDE SERPL-SCNC: 102 MMOL/L (ref 98–107)
CO2 SERPL-SCNC: 29 MMOL/L (ref 21–32)
CREAT SERPL-MCNC: 0.9 MG/DL (ref 0.55–1.3)
EOSINOPHIL NFR BLD AUTO: 5.1 % (ref 0–3)
ERYTHROCYTE [DISTWIDTH] IN BLOOD BY AUTOMATED COUNT: 11.8 % (ref 11.6–14.8)
GLOBULIN SER-MCNC: 4 G/DL
HCT VFR BLD CALC: 44.5 % (ref 42–52)
HGB BLD-MCNC: 15.8 G/DL (ref 14.2–18)
LYMPHOCYTES NFR BLD AUTO: 26.3 % (ref 20–45)
MCV RBC AUTO: 88 FL (ref 80–99)
MONOCYTES NFR BLD AUTO: 10.9 % (ref 1–10)
NEUTROPHILS NFR BLD AUTO: 55.5 % (ref 45–75)
PLATELET # BLD: 208 K/UL (ref 150–450)
POTASSIUM SERPL-SCNC: 3.5 MMOL/L (ref 3.5–5.1)
RBC # BLD AUTO: 5.05 M/UL (ref 4.7–6.1)
SODIUM SERPL-SCNC: 137 MMOL/L (ref 136–145)
WBC # BLD AUTO: 5 K/UL (ref 4.8–10.8)

## 2018-02-11 RX ADMIN — DEXTROSE AND SODIUM CHLORIDE SCH MLS/HR: 5; .45 INJECTION, SOLUTION INTRAVENOUS at 22:32

## 2018-02-11 RX ADMIN — PANTOPRAZOLE SODIUM SCH MG: 40 INJECTION, POWDER, FOR SOLUTION INTRAVENOUS at 08:26

## 2018-02-11 RX ADMIN — HEPARIN SODIUM SCH UNITS: 5000 INJECTION INTRAVENOUS; SUBCUTANEOUS at 08:27

## 2018-02-11 RX ADMIN — DEXTROSE AND SODIUM CHLORIDE SCH MLS/HR: 5; .45 INJECTION, SOLUTION INTRAVENOUS at 15:52

## 2018-02-11 RX ADMIN — DEXTROSE AND SODIUM CHLORIDE SCH MLS/HR: 5; .45 INJECTION, SOLUTION INTRAVENOUS at 11:15

## 2018-02-11 NOTE — GENERAL SURGERY PROGRESS NOTE
General Surgery-Progress Note


Subjective


Additional Comments


no acute events. states he is hungry.  no n/v/f/c.  states RUQ pain with 

radiation to back no tenderness on exam.





Objective





Last 24 Hour Vital Signs








  Date Time  Temp Pulse Resp B/P (MAP) Pulse Ox O2 Delivery O2 Flow Rate FiO2


 


2/11/18 07:47 97.0 54 17 113/60 97 Room Air  


 


2/11/18 04:00 97.8 79 18 100/62 95 Room Air  


 


2/11/18 01:00 98.1 59 18 110/59 98 Room Air  


 


2/10/18 20:00 98.5 61 19 114/64 97 Room Air  


 


2/10/18 17:08 98.3 61 20 121/68 99   








I&O











Intake and Output  


 


 2/10/18 2/11/18





 19:00 07:00


 


Intake Total 750 ml 825 ml


 


Balance 750 ml 825 ml


 


  


 


IV Total 750 ml 825 ml








Cardiovascular:  RSR


Respiratory:  clear


Abdomen:  soft, non-tender, present bowel sounds


Extremities:  no edema, no tenderness, no cyanosis





Laboratory Tests








Test


  2/11/18


08:14


 


White Blood Count


  5.0 K/UL


(4.8-10.8)


 


Red Blood Count


  5.05 M/UL


(4.70-6.10)


 


Hemoglobin


  15.8 G/DL


(14.2-18.0)


 


Hematocrit


  44.5 %


(42.0-52.0)


 


Mean Corpuscular Volume 88 FL (80-99)  


 


Mean Corpuscular Hemoglobin


  31.3 PG


(27.0-31.0)  H


 


Mean Corpuscular Hemoglobin


Concent 35.6 G/DL


(32.0-36.0)


 


Red Cell Distribution Width


  11.8 %


(11.6-14.8)


 


Platelet Count


  208 K/UL


(150-450)


 


Mean Platelet Volume


  8.3 FL


(6.5-10.1)


 


Neutrophils (%) (Auto)


  55.5 %


(45.0-75.0)


 


Lymphocytes (%) (Auto)


  26.3 %


(20.0-45.0)


 


Monocytes (%) (Auto)


  10.9 %


(1.0-10.0)  H


 


Eosinophils (%) (Auto)


  5.1 %


(0.0-3.0)  H


 


Basophils (%) (Auto)


  2.3 %


(0.0-2.0)  H


 


Activated Partial


Thromboplast Time 31 SEC (23-33)


 


 


Sodium Level


  137 MMOL/L


(136-145)


 


Potassium Level


  3.5 MMOL/L


(3.5-5.1)


 


Chloride Level


  102 MMOL/L


()


 


Carbon Dioxide Level


  29 MMOL/L


(21-32)


 


Anion Gap


  6 mmol/L


(5-15)


 


Blood Urea Nitrogen


  11 mg/dL


(7-18)


 


Creatinine


  0.9 MG/DL


(0.55-1.30)


 


Estimat Glomerular Filtration


Rate > 60 mL/min


(>60)


 


Glucose Level


  101 MG/DL


()


 


Calcium Level


  9.3 MG/DL


(8.5-10.1)


 


Total Bilirubin


  0.8 MG/DL


(0.2-1.0)


 


Aspartate Amino Transf


(AST/SGOT) 20 U/L (15-37)


 


 


Alanine Aminotransferase


(ALT/SGPT) 45 U/L (12-78)


 


 


Alkaline Phosphatase


  68 U/L


()


 


Total Protein


  7.5 G/DL


(6.4-8.2)


 


Albumin


  3.5 G/DL


(3.4-5.0)


 


Globulin 4.0 g/dL  


 


Albumin/Globulin Ratio


  0.9 (1.0-2.7)


L


 


Amylase Level


  77 U/L


()


 


Lipase


  220 U/L


()











Plan


Problems:  


(1) Cholelithiasis


Assessment & Plan:  37 year male now presents with symptoms of biliary colic.  

Prior presented with left sided pain that resolved and did not have relation to 

food.  identified to have cholelithiasis with positive HIDA on prior admission 

but asymptomatic.  now presents with "classic" symptoms of symptomatic 

cholelithiasis; RUQ pain worsened with oral intake of fatty foods).   Afebrile, 

HD stable, labs normal. 


Discussed findings with patient and he and family are very concerned about new 

symptoms and gallbladder disease.  Given findings on prior recent admission of 

positive HIDA with cholelithiasis, cholecystectomy recommended and was 

recommended on last admission as well.  Since symptoms had resolved recommend 

elective surgery as risks, benefits, and post op are improved in the elective 

setting.  Since presenting again and now having biliary symptoms will proceed 

with cholecystectomy while in hospital.  





-will have to discuss and coordinate with OR to find time for lap vs open 

cholecystectomy possibly tomorrow. 


-npo


-iv fluids


-consent.  





risks, benefits, and alternatives to surgery discussed with patient in detail.  

elective vs hospital stay procedure discussed with patient in detail.  we 

discussed possibility of recurrent left sided pain and symptoms even after 

surgery as they may not be related to gallbladder.  risks of bleeding, infection

, organ injury, CBD injury, liver injury, biloma, more procedures, need for re 

operation, and conversion to open procedure discussed with patient in detail 

prior 














Lukas Greene Feb 11, 2018 15:21

## 2018-02-11 NOTE — GENERAL PROGRESS NOTE
Assessment/Plan


Problem List:  


(1) Cholecystitis, acute with cholelithiasis


ICD Codes:  K80.00 - Calculus of gallbladder with acute cholecystitis without 

obstruction


SNOMED:  27512092


Qualifiers:  


   Qualified Codes:  K80.00 - Calculus of gallbladder with acute cholecystitis 

without obstruction


(2) Recurrent biliary colic


ICD Codes:  K80.50 - Calculus of bile duct without cholangitis or cholecystitis 

without obstruction


SNOMED:  95988613


(3) Cholelithiasis


ICD Codes:  K80.20 - Calculus of gallbladder without cholecystitis without 

obstruction


SNOMED:  640285223


Qualifiers:  


   Qualified Codes:  K80.20 - Calculus of gallbladder without cholecystitis 

without obstruction


(4) GERD (gastroesophageal reflux disease)


ICD Codes:  K21.9 - Gastro-esophageal reflux disease without esophagitis


SNOMED:  701397077


(5) Abdominal pain


ICD Codes:  R10.9 - Unspecified abdominal pain


SNOMED:  00007680


Status:  unchanged


Assessment/Plan


npo ivf abx cbc bmp am pain control gi/sx f/u





Subjective


Constitutional:  Reports: weakness


Allergies:  


Coded Allergies:  


     No Known Allergies (Unverified , 2/6/18)


All Systems:  reviewed and negative except above


Subjective


npo





Objective





Last 24 Hour Vital Signs








  Date Time  Temp Pulse Resp B/P (MAP) Pulse Ox O2 Delivery O2 Flow Rate FiO2


 


2/11/18 07:47 97.0 54 17 113/60 97 Room Air  


 


2/11/18 04:00 97.8 79 18 100/62 95 Room Air  


 


2/11/18 01:00 98.1 59 18 110/59 98 Room Air  


 


2/10/18 20:00 98.5 61 19 114/64 97 Room Air  


 


2/10/18 17:08 98.3 61 20 121/68 99   

















Intake and Output  


 


 2/10/18 2/11/18





 19:00 07:00


 


Intake Total 750 ml 825 ml


 


Balance 750 ml 825 ml


 


  


 


IV Total 750 ml 825 ml








Laboratory Tests


2/11/18 08:14: 


White Blood Count [Pending], Red Blood Count [Pending], Hemoglobin [Pending], 

Hematocrit [Pending], Mean Corpuscular Volume [Pending], Mean Corpuscular 

Hemoglobin [Pending], Mean Corpuscular Hemoglobin Concent [Pending], Red Cell 

Distribution Width [Pending], Platelet Count [Pending], Mean Platelet Volume [

Pending], Neutrophils (%) (Auto) [Pending], Lymphocytes (%) (Auto) [Pending], 

Monocytes (%) (Auto) [Pending], Eosinophils (%) (Auto) [Pending], Basophils (%) 

(Auto) [Pending], Activated Partial Thromboplast Time [Pending], Sodium Level [

Pending], Potassium Level [Pending], Chloride Level [Pending], Carbon Dioxide 

Level [Pending], Blood Urea Nitrogen [Pending], Creatinine [Pending], Estimat 

Glomerular Filtration Rate [Pending], Glucose Level [Pending], Calcium Level [

Pending], Total Bilirubin [Pending], Aspartate Amino Transf (AST/SGOT) [Pending]

, Alanine Aminotransferase (ALT/SGPT) [Pending], Alkaline Phosphatase [Pending]

, Total Protein [Pending], Albumin [Pending], Globulin [Pending], Amylase Level 

[Pending], Lipase [Pending]


Height (Feet):  5


Height (Inches):  7.00


Weight (Pounds):  170


General Appearance:  alert


EENT:  normal ENT inspection


Neck:  normal alignment


Cardiovascular:  normal peripheral pulses, normal rate, regular rhythm


Respiratory/Chest:  chest wall non-tender, lungs clear, normal breath sounds


Abdomen:  normal bowel sounds, non tender, soft


Extremities:  normal inspection


Edema:  no edema noted Arm (L), no edema noted Arm (R), no edema noted Leg (L), 

no edema noted Leg (R), no edema noted Pedal (L), no edema noted Pedal (R), no 

edema noted Generalized


Neurologic:  responsive, motor weakness


Skin:  normal pigmentation, warm/dry











REBECCA VARMA Feb 11, 2018 09:12

## 2018-02-11 NOTE — GENERAL PROGRESS NOTE
Assessment/Plan


Assessment/Plan


Assessment


- Cholelithiasis


- cholecystitis vs biliary colic





Recommendations


- await lab rudy


- follow exam and symptoms





Subjective


Allergies:  


Coded Allergies:  


     No Known Allergies (Unverified , 2/6/18)


Subjective


above noted


pain without change





Objective





Last 24 Hour Vital Signs








  Date Time  Temp Pulse Resp B/P (MAP) Pulse Ox O2 Delivery O2 Flow Rate FiO2


 


2/11/18 17:00 97.9 63 18 101/51 97 Room Air  


 


2/11/18 07:47 97.0 54 17 113/60 97 Room Air  


 


2/11/18 04:00 97.8 79 18 100/62 95 Room Air  


 


2/11/18 01:00 98.1 59 18 110/59 98 Room Air  


 


2/10/18 20:00 98.5 61 19 114/64 97 Room Air  

















Intake and Output  


 


 2/10/18 2/11/18





 19:00 07:00


 


Intake Total 750 ml 825 ml


 


Balance 750 ml 825 ml


 


  


 


IV Total 750 ml 825 ml








Laboratory Tests


2/11/18 08:14: 


White Blood Count 5.0, Red Blood Count 5.05, Hemoglobin 15.8, Hematocrit 44.5, 

Mean Corpuscular Volume 88, Mean Corpuscular Hemoglobin 31.3H, Mean Corpuscular 

Hemoglobin Concent 35.6, Red Cell Distribution Width 11.8, Platelet Count 208, 

Mean Platelet Volume 8.3, Neutrophils (%) (Auto) 55.5, Lymphocytes (%) (Auto) 

26.3, Monocytes (%) (Auto) 10.9H, Eosinophils (%) (Auto) 5.1H, Basophils (%) (

Auto) 2.3H, Activated Partial Thromboplast Time 31, Sodium Level 137, Potassium 

Level 3.5, Chloride Level 102, Carbon Dioxide Level 29, Anion Gap 6, Blood Urea 

Nitrogen 11, Creatinine 0.9, Estimat Glomerular Filtration Rate > 60, Glucose 

Level 101, Calcium Level 9.3, Total Bilirubin 0.8, Aspartate Amino Transf (AST/

SGOT) 20, Alanine Aminotransferase (ALT/SGPT) 45, Alkaline Phosphatase 68, 

Total Protein 7.5, Albumin 3.5, Globulin 4.0, Albumin/Globulin Ratio 0.9L, 

Amylase Level 77, Lipase 220


Height (Feet):  5


Height (Inches):  7.00


Weight (Pounds):  170


Objective


NCAT


supple


CTA


RRR


Soft Mild RUQ TTP


no edema











KARI PADILLA Feb 11, 2018 19:14

## 2018-02-11 NOTE — PULMONOLOGY PROGRESS NOTE
Assessment/Plan


Problems:  


(1) Cholecystitis, acute with cholelithiasis


(2) Recurrent biliary colic


Assessment/Plan


for surgery in am


symptomatic treatment





Subjective


ROS Limited/Unobtainable:  No


Interval Events:  for surgery in am


Allergies:  


Coded Allergies:  


     No Known Allergies (Unverified , 2/6/18)





Objective





Last 24 Hour Vital Signs








  Date Time  Temp Pulse Resp B/P (MAP) Pulse Ox O2 Delivery O2 Flow Rate FiO2


 


2/11/18 07:47 97.0 54 17 113/60 97 Room Air  


 


2/11/18 04:00 97.8 79 18 100/62 95 Room Air  


 


2/11/18 01:00 98.1 59 18 110/59 98 Room Air  


 


2/10/18 20:00 98.5 61 19 114/64 97 Room Air  


 


2/10/18 17:08 98.3 61 20 121/68 99   

















Intake and Output  


 


 2/10/18 2/11/18





 19:00 07:00


 


Intake Total 750 ml 825 ml


 


Balance 750 ml 825 ml


 


  


 


IV Total 750 ml 825 ml








Objective


General Appearance:  WN/WD


HEENT:  normocephalic, atraumatic


Respiratory/Chest:  chest wall non-tender, normal breath sounds


Cardiovascular:  normal peripheral pulses, normal rate


Abdomen:  soft, non tender, no organomegaly


Genitourinary:  normal external genitalia


Extremities:  no clubbing


Skin:  no rash, no lesions


Neurologic/Psychiatric:  CNs II-XII grossly normal, 


Lymphatic:  no neck adenopathy


Laboratory Tests


2/11/18 08:14: 


White Blood Count 5.0, Red Blood Count 5.05, Hemoglobin 15.8, Hematocrit 44.5, 

Mean Corpuscular Volume 88, Mean Corpuscular Hemoglobin 31.3H, Mean Corpuscular 

Hemoglobin Concent 35.6, Red Cell Distribution Width 11.8, Platelet Count 208, 

Mean Platelet Volume 8.3, Neutrophils (%) (Auto) 55.5, Lymphocytes (%) (Auto) 

26.3, Monocytes (%) (Auto) 10.9H, Eosinophils (%) (Auto) 5.1H, Basophils (%) (

Auto) 2.3H, Activated Partial Thromboplast Time 31, Sodium Level 137, Potassium 

Level 3.5, Chloride Level 102, Carbon Dioxide Level 29, Anion Gap 6, Blood Urea 

Nitrogen 11, Creatinine 0.9, Estimat Glomerular Filtration Rate > 60, Glucose 

Level 101, Calcium Level 9.3, Total Bilirubin 0.8, Aspartate Amino Transf (AST/

SGOT) 20, Alanine Aminotransferase (ALT/SGPT) 45, Alkaline Phosphatase 68, 

Total Protein 7.5, Albumin 3.5, Globulin 4.0, Albumin/Globulin Ratio 0.9L, 

Amylase Level 77, Lipase 220





Current Medications








 Medications


  (Trade)  Dose


 Ordered  Sig/Anali


 Route


 PRN Reason  Start Time


 Stop Time Status Last Admin


Dose Admin


 


 Acetaminophen


  (Tylenol)  650 mg  Q4H  PRN


 ORAL


 fever  2/10/18 07:45


 3/12/18 07:44   


 


 


 Al Hydroxide/Mg


 Hydroxide


  (Mylanta II)  30 ml  Q6H  PRN


 ORAL


 dyspepsia  2/10/18 07:45


 3/12/18 07:44   


 


 


 Dextrose


  (Dextrose 50%)    STAT  PRN


 IV


 Hypoglycemia  2/10/18 07:45


 3/12/18 07:44   


 


 


 Dextrose/Sodium


 Chloride  1,000 ml @ 


 75 mls/hr  [id number]


 IV


   2/10/18 09:00


 3/12/18 08:59  2/10/18 21:10


 


 


 Diphenhydramine


 HCl


  (Benadryl)  25 mg  Q6H  PRN


 ORAL


 Itching/Pruritis  2/10/18 07:45


 3/12/18 07:44   


 


 


 Famotidine


  (Pepcid)  20 mg  DAILY


 ORAL


   2/10/18 13:00


 3/12/18 12:59  2/11/18 08:27


 


 


 Heparin Sodium


  (Porcine)


  (Heparin 5000


 units/ml)  5,000 units  EVERY 12  HOURS


 SUBQ


   2/10/18 09:00


 3/12/18 08:59  2/11/18 08:27


 


 


 Morphine Sulfate


  (Morphine


 Sulfate)  2 mg  Q4H  PRN


 IVP


 severe  Pain (Pain Scale 7-10)  2/10/18 07:45


 2/17/18 07:44   


 


 


 Nitroglycerin


  (Ntg)  0.4 mg  Q5M X 3 DOSES PRN


 SL


 Prn Chest Pain  2/10/18 07:45


 3/12/18 07:44   


 


 


 Ondansetron HCl


  (Zofran)  4 mg  Q6H  PRN


 IVP


 Nausea & Vomiting  2/10/18 07:45


 3/12/18 07:44   


 


 


 Pantoprazole


  (Protonix)  40 mg  DAILY


 IVP


   2/10/18 09:00


 3/12/18 08:59  2/11/18 08:26


 


 


 Polyethylene


 Glycol


  (Miralax)  17 gm  HSPRN  PRN


 ORAL


 Constipation  2/10/18 07:45


 3/12/18 07:44   


 


 


 Temazepam


  (Restoril)  15 mg  HSPRN  PRN


 ORAL


 Insomnia  2/10/18 07:45


 2/17/18 07:44   


 

















HESHAM RUBIO Feb 11, 2018 09:40

## 2018-02-12 VITALS — SYSTOLIC BLOOD PRESSURE: 130 MMHG | DIASTOLIC BLOOD PRESSURE: 68 MMHG

## 2018-02-12 VITALS — SYSTOLIC BLOOD PRESSURE: 126 MMHG | DIASTOLIC BLOOD PRESSURE: 65 MMHG

## 2018-02-12 VITALS — DIASTOLIC BLOOD PRESSURE: 74 MMHG | SYSTOLIC BLOOD PRESSURE: 124 MMHG

## 2018-02-12 VITALS — DIASTOLIC BLOOD PRESSURE: 55 MMHG | SYSTOLIC BLOOD PRESSURE: 105 MMHG

## 2018-02-12 VITALS — DIASTOLIC BLOOD PRESSURE: 85 MMHG | SYSTOLIC BLOOD PRESSURE: 140 MMHG

## 2018-02-12 VITALS — DIASTOLIC BLOOD PRESSURE: 84 MMHG | SYSTOLIC BLOOD PRESSURE: 139 MMHG

## 2018-02-12 VITALS — DIASTOLIC BLOOD PRESSURE: 74 MMHG | SYSTOLIC BLOOD PRESSURE: 132 MMHG

## 2018-02-12 VITALS — DIASTOLIC BLOOD PRESSURE: 87 MMHG | SYSTOLIC BLOOD PRESSURE: 141 MMHG

## 2018-02-12 VITALS — DIASTOLIC BLOOD PRESSURE: 75 MMHG | SYSTOLIC BLOOD PRESSURE: 134 MMHG

## 2018-02-12 VITALS — SYSTOLIC BLOOD PRESSURE: 117 MMHG | DIASTOLIC BLOOD PRESSURE: 64 MMHG

## 2018-02-12 VITALS — SYSTOLIC BLOOD PRESSURE: 131 MMHG | DIASTOLIC BLOOD PRESSURE: 77 MMHG

## 2018-02-12 VITALS — SYSTOLIC BLOOD PRESSURE: 132 MMHG | DIASTOLIC BLOOD PRESSURE: 74 MMHG

## 2018-02-12 VITALS — SYSTOLIC BLOOD PRESSURE: 138 MMHG | DIASTOLIC BLOOD PRESSURE: 87 MMHG

## 2018-02-12 LAB
ADD MANUAL DIFF: NO
ANION GAP SERPL CALC-SCNC: 7 MMOL/L (ref 5–15)
APTT BLD: 31 SEC (ref 23–33)
BASOPHILS NFR BLD AUTO: 1.2 % (ref 0–2)
BUN SERPL-MCNC: 12 MG/DL (ref 7–18)
CALCIUM SERPL-MCNC: 9 MG/DL (ref 8.5–10.1)
CHLORIDE SERPL-SCNC: 102 MMOL/L (ref 98–107)
CO2 SERPL-SCNC: 30 MMOL/L (ref 21–32)
CREAT SERPL-MCNC: 1.1 MG/DL (ref 0.55–1.3)
EOSINOPHIL NFR BLD AUTO: 3.8 % (ref 0–3)
ERYTHROCYTE [DISTWIDTH] IN BLOOD BY AUTOMATED COUNT: 11.7 % (ref 11.6–14.8)
HCT VFR BLD CALC: 43.6 % (ref 42–52)
HGB BLD-MCNC: 15.6 G/DL (ref 14.2–18)
INR PPP: 1 (ref 0.9–1.1)
LYMPHOCYTES NFR BLD AUTO: 30.8 % (ref 20–45)
MCV RBC AUTO: 88 FL (ref 80–99)
MONOCYTES NFR BLD AUTO: 10.3 % (ref 1–10)
NEUTROPHILS NFR BLD AUTO: 54 % (ref 45–75)
PLATELET # BLD: 208 K/UL (ref 150–450)
POTASSIUM SERPL-SCNC: 3.5 MMOL/L (ref 3.5–5.1)
RBC # BLD AUTO: 4.96 M/UL (ref 4.7–6.1)
SODIUM SERPL-SCNC: 139 MMOL/L (ref 136–145)
WBC # BLD AUTO: 5 K/UL (ref 4.8–10.8)

## 2018-02-12 PROCEDURE — 0FT44ZZ RESECTION OF GALLBLADDER, PERCUTANEOUS ENDOSCOPIC APPROACH: ICD-10-PCS

## 2018-02-12 RX ADMIN — DOCUSATE SODIUM SCH MG: 100 CAPSULE, LIQUID FILLED ORAL at 18:47

## 2018-02-12 RX ADMIN — PANTOPRAZOLE SODIUM SCH MG: 40 INJECTION, POWDER, FOR SOLUTION INTRAVENOUS at 09:35

## 2018-02-12 RX ADMIN — DEXTROSE AND SODIUM CHLORIDE SCH MLS/HR: 5; .45 INJECTION, SOLUTION INTRAVENOUS at 18:47

## 2018-02-12 RX ADMIN — DEXTROSE AND SODIUM CHLORIDE SCH MLS/HR: 5; .45 INJECTION, SOLUTION INTRAVENOUS at 09:35

## 2018-02-12 NOTE — PRE-PROCEDURE NOTE/ATTESTATION
Pre-Procedure Note/Attestation


Complete Prior to Procedure


Planned Procedure:  not applicable


Procedure Narrative:


laparoscopic cholecystectomy possible open





Indications for Procedure


Pre-Operative Diagnosis:


cholecystitis





Attestation


I attest that I discussed the nature of the procedure; its benefits; risks and 

complications; and alternatives (and the risks and benefits of such alternatives

), prior to the procedure, with the patient (or the patient's legal 

representative).





I attest that, if there was a reasonable possibility of needing a blood 

transfusion, the patient (or the patient's legal representative) was given the 

[facility] standardized written summary, pursuant 

to the Rachid Alber Blood Safety Act ([place] Health and Safety Code # 1645, as 

amended).





I attest that I re-evaluated the patient just prior to the surgery and that 

there has been no change in the patient's H&P, except as documented below:











Lukas Greene Feb 12, 2018 11:52

## 2018-02-12 NOTE — OPERATIVE NOTE - DICTATED
DATE OF OPERATION:  02/12/2018



PREOPERATIVE DIAGNOSIS:  Cholecystitis.



POSTOPERATIVE DIAGNOSIS:  Cholecystitis and cholelithiasis.



OPERATION PERFORMED:  Laparoscopic cholecystectomy



Surgeon:  Lukas Greene M.D.



ASSISTANT:  None.



ANESTHESIOLOGIST:  Dr. Jones.



ANESTHESIA:  General GTA.



ESTIMATED BLOOD LOSS:  Minimal.



IV FLUIDS:  Please see anesthesia records.



COMPLICATIONS:  None.



WOUND CLASSIFICATION:  Class III.



IV ANTIBIOTICS:  A 2 g Ancef IV given one hour prior to cut time.



COUNTS:  Sponge and needle count correct x2.



DRAINS:  None.



INDICATIONS FOR PROCEDURE:  This 37-year-old male who presented to the

emergency department at [facility] a few days ago complaining

of left upper quadrant abdominal pain.  During workup, he was found to

have cholelithiasis, but at that time did not have symptoms of

cholecystitis.  The patient was discharged home and returned within 24

hours with classical symptoms cholecystitis including right upper quadrant

pain, pain in the right upper back and pain worsening with fatty food

intake.  The patient had a CT scan with some edema around the gallbladder

and cholelithiasis.  The patient had an ultrasound, which demonstrated

cholelithiasis, but no gallbladder wall thickening or significant edema.

The patient had a HIDA scan, which was positive with cystic duct

obstruction, but normal common duct.  Given these findings, the patient's

current symptoms and the patient's strong desire to proceed with surgery.

We discussed cholecystitis, cholelithiasis and gallbladder syndromes and

condition over multiple visits during initial visit and subsequent visit.

I explained the patient that if he is asymptomatic, an elective surgery

would be highly recommended, but the patient states that he is symptomatic

and had remained symptomatic for over 48 hours during admission prior to

surgery.  Risks, benefits and alternatives, including but not exclusive to

bleeding, infection, injury to organs, common duct, perforation, trocar

insertion injury, hernia, wound healing, wound infection and potential

need for reoperation, and transfusions were discussed with the patient in

detail preoperatively.  The patient expressed understanding and consented

to surgery.  I discussed with the patient and his family for 45 minutes

all the potential outcomes of surgical intervention including

nonoperating.  Consent was obtained for surgery, which was performed on

02/12/2018.



OPERATIVE NOTE:  The patient taken to the operating room and placed on the

operating table in supine position.  Bilateral arms out.  All bony

prominences well padded with gel pads.  Preoperative time-out was taken

identifying the patient, procedure, operative staff, and surgical staff.

SCDs were placed.  The Velasquez catheter was not inserted given the patient

voided prior to entering the operating room.  A 2 g Ancef IV were given

one hour prior to cut time.  General anesthesia was induced and the

patient was intubated.  The abdomen was then clipped, prepped and draped

in standard surgical fashion.  A infraumbilical incision was made, which

was carried down to the fascia. The fascia was elevated and incised and

entry into the abdomen obtained using the open Sharona technique without

complication.  A Sharona trocar was inserted and the abdomen was then

insufflated to 12-15 mmHg.  The patient tolerated the insufflation well.

The patient was placed in reverse Trendelenburg with left side down.  The

gallbladder was identified.  The secondary trocar was then placed under

direct visualization beginning with a epigastric subxiphoid 12 mm port

followed by two 5 mm right subcostal ports.  All secondary trocars were

placed under direct visualization without any complications.  Using most

lateral port, the dome of the gallbladder was grasped and retracted over

the liver bed.  The remainder of the gallbladder was then visualized.  The

infundibular gallbladder was grasped using the midclavicular port.  Some

adhesions between the omentum and gallbladder were taken down with blunt

dissection.  Following this, a small incision made into the peritoneum

overlying the gallbladder, which was then pulled down exposing the

gallbladder wall.  Once this was obtained, the gallbladder infundibulum

was easily in view and retracted towards the appendix.  This exposed

closed triangle.  With simple dissection, the cystic duct, cystic artery,

and cystic plate and cystic node were identified with only two triggers

entering into the gallbladder around the area of the infundibulum being

the cystic duct and artery.  No other abnormalities were identified.  At

this time, the critical view was obtained.  The cystic artery was then

triply clipped and divided.  The cystic duct was then doubly clipped and

divided.  Once this was complete, the gallbladder was taken off the liver

bed with electrocautery.  In the right lateral mid gallbladder, there was

a small bleeding vessel, which was doubly clipped and divided.  In

removing the gallbladder from the liver bed, was noted to be a fairly

intrahepatic gallbladder especially at the dome.  There was no clear

identifiable plane between the gallbladder and the liver bed likely from a

chronic inflammation and mild oozing was identified.  Once the gallbladder

was completely removed off the liver bed with only the dome as the

remaining portion, electrocautery was used to obtain hemostasis.  The

remainder of the gallbladder was removed at the dome and put in an

endoscopic retrieval bag and removed through the subxiphoid port.  Of

note, two stones were released during the dome portion of the gallbladder

removal both were retrieved and evacuated from the abdomen.  Following

this, the cystic duct and artery stumps were identified and noted to have

clips in place and otherwise satisfactory.  The liver bed was evaluated

and noted to be hemostatic with no leaking of bile or oozing of blood.

Approximately two liters of irrigation were used to copiously irrigate the

abdomen and evacuate all debris, clots and fluid.  At this time, we began

the conclusion of our procedure.  The large thickened gallbladder with

multiple large stones was removed through the subxiphoid port with utility

of extending the port one average to get the gallbladder out in

satisfactory condition.  Once this was complete, hemostasis was used to

achieve hemostasis around the rectus muscle in the subxiphoid space that

was extended laterally for a gallbladder and for specimen removal.  Once

this was complete, on final inspection, no abnormalities are noted.  Secondary

trocars were removed under direct visualization.  No immediate

postoperative complications noted.  The umbilical and subxiphoid fascia

were closed using multiple figure-of-eight #0 Vicryl sutures.  The

remaining skin incisions were then irrigated out local anesthetic

infiltrated and all incisions were then closed using surgical skin

staples.  Dressings were applied.  The patient tolerated the procedure

well, was extubated and taken to the postanesthetic care unit in stable

condition.









  ______________________________________________

  Lukas Greene M.D.





DR:  PRINCE

D:  02/12/2018 15:46

T:  02/12/2018 21:31

JOB#:  [id number]

CC:



LEVI

## 2018-02-12 NOTE — ANETHESIA PREOPERATIVE EVAL
Anesthesia Pre-op PMH/ROS


General


Date of Evaluation:  Feb 12, 2018


Time of Evaluation:  13:30


Anesthesiologist:  Robert


ASA Score:  ASA 1


Mallampati Score


Class I : Soft palate, uvula, fauces, pillars visible


Class II: Soft palate, uvula, fauces visible


Class III: Soft palate, base of uvula visible


Class IV: Only hard plate visible


Mallampati Classification:  Class II


Surgeon:  Ramona


Diagnosis:  Acute cholecystitis


Surgical Procedure:  Lap Sarah


Anesthesia History:  none


Allergies:  


Coded Allergies:  


     No Known Allergies (Unverified , 2/6/18)


Medications:  see eMAR





Past Medical History


Cardiovascular:  Denies: HTN, CAD, MI, valve dz, arrhythmia, other


Pulmonary:  Denies: asthma, COPD, SMITH, other


Gastrointestinal/Genitourinary:  Denies: GERD, CRI, ESRD, other


Neurologic/Psychiatric:  Denies: dementia, CVA, depression/anxiety, TIA, other


Endocrine:  Denies: DM, hypothyroidism, steroids, other


HEENT:  Denies: cataract (L), cataract (R), glaucoma, Ute (L), Ute (R), other


Hematology/Immune:  Denies: anemia, DVT, bleeding disorder, other


Musculoskeletal/Integumentary:  Denies: OA, RA, DJD, DDD, edema, other


Other:  obesity


PMH Narrative:


Denies significant PMH


PSxH Narrative:


Denies





Anesthesia Pre-op Phys. Exam


Physician Exam





Last Vital Signs








  Date Time  Temp Pulse Resp B/P (MAP) Pulse Ox O2 Delivery O2 Flow Rate FiO2


 


2/12/18 12:00 97.3 58 20 117/64 96 Room Air  








Constitutional:  NAD


Neurologic:  CN 2-12 intact


Cardiovascular:  RRR, no M/R/G


Respiratory:  CTA


Gastrointestinal:  S/NT/ND





Airway Exam


Mallampati Score:  Class II


MO:  full


ROM:  full


Teeth:  intact





Anesthesia Pre-op A/P


Labs





Hematology








Test


  2/12/18


04:30


 


White Blood Count


  5.0 K/UL


(4.8-10.8)


 


Red Blood Count


  4.96 M/UL


(4.70-6.10)


 


Hemoglobin


  15.6 G/DL


(14.2-18.0)


 


Hematocrit


  43.6 %


(42.0-52.0)


 


Mean Corpuscular Volume 88 FL (80-99)  


 


Mean Corpuscular Hemoglobin


  31.4 PG


(27.0-31.0)  H


 


Mean Corpuscular Hemoglobin


Concent 35.7 G/DL


(32.0-36.0)


 


Red Cell Distribution Width


  11.7 %


(11.6-14.8)


 


Platelet Count


  208 K/UL


(150-450)


 


Mean Platelet Volume


  9.0 FL


(6.5-10.1)


 


Neutrophils (%) (Auto)


  54.0 %


(45.0-75.0)


 


Lymphocytes (%) (Auto)


  30.8 %


(20.0-45.0)


 


Monocytes (%) (Auto)


  10.3 %


(1.0-10.0)  H


 


Eosinophils (%) (Auto)


  3.8 %


(0.0-3.0)  H


 


Basophils (%) (Auto)


  1.2 %


(0.0-2.0)








Chemistry








Test


  2/12/18


04:30


 


Sodium Level


  139 MMOL/L


(136-145)


 


Potassium Level


  3.5 MMOL/L


(3.5-5.1)


 


Chloride Level


  102 MMOL/L


()


 


Carbon Dioxide Level


  30 MMOL/L


(21-32)


 


Anion Gap


  7 mmol/L


(5-15)


 


Blood Urea Nitrogen


  12 mg/dL


(7-18)


 


Creatinine


  1.1 MG/DL


(0.55-1.30)


 


Estimat Glomerular Filtration


Rate > 60 mL/min


(>60)


 


Glucose Level


  98 MG/DL


()


 


Calcium Level


  9.0 MG/DL


(8.5-10.1)











Risk Assessment & Plan


Assessment:


Healthy male for lap sarah


Plan:


GETA


Status Change Before Surgery:  No





Pre-Antibiotics


Drug:  Ancef


Given Within 1 Hr of Incision:  Yes


Time Given:  13:50











PASTOR ESPINAL M.D. Feb 12, 2018 14:29

## 2018-02-12 NOTE — GENERAL PROGRESS NOTE
Assessment/Plan


Problem List:  


(1) Cholecystitis, acute with cholelithiasis


ICD Codes:  K80.00 - Calculus of gallbladder with acute cholecystitis without 

obstruction


SNOMED:  65352080


Qualifiers:  


   Qualified Codes:  K80.00 - Calculus of gallbladder with acute cholecystitis 

without obstruction


(2) Recurrent biliary colic


ICD Codes:  K80.50 - Calculus of bile duct without cholangitis or cholecystitis 

without obstruction


SNOMED:  92344797


(3) Cholelithiasis


ICD Codes:  K80.20 - Calculus of gallbladder without cholecystitis without 

obstruction


SNOMED:  456792615


Qualifiers:  


   Qualified Codes:  K80.20 - Calculus of gallbladder without cholecystitis 

without obstruction


(4) GERD (gastroesophageal reflux disease)


ICD Codes:  K21.9 - Gastro-esophageal reflux disease without esophagitis


SNOMED:  594892078


(5) Abdominal pain


ICD Codes:  R10.9 - Unspecified abdominal pain


SNOMED:  68078009


Status:  unchanged


Assessment/Plan


npo ivf abx cbc bmp am pain control gi/sx f/u pending cholecystectomy





Subjective


Constitutional:  Reports: weakness


Allergies:  


Coded Allergies:  


     No Known Allergies (Unverified , 2/6/18)


All Systems:  reviewed and negative except above


Subjective


npo





Objective





Last 24 Hour Vital Signs








  Date Time  Temp Pulse Resp B/P (MAP) Pulse Ox O2 Delivery O2 Flow Rate FiO2


 


2/12/18 12:00 97.3 58 20 117/64 96 Room Air  


 


2/12/18 08:00 97.8 73 16 130/68 99 Room Air  


 


2/12/18 04:00 97.7 62 16 105/55 98 Room Air  


 


2/11/18 20:00 97.8 67 18 101/64 97 Room Air  


 


2/11/18 17:00 97.9 63 18 101/51 97 Room Air  

















Intake and Output  


 


 2/11/18 2/12/18





 19:00 07:00


 


Intake Total 825 ml 800 ml


 


Balance 825 ml 800 ml


 


  


 


IV Total 825 ml 800 ml








Laboratory Tests


2/12/18 04:30: 


White Blood Count 5.0, Red Blood Count 4.96, Hemoglobin 15.6, Hematocrit 43.6, 

Mean Corpuscular Volume 88, Mean Corpuscular Hemoglobin 31.4H, Mean Corpuscular 

Hemoglobin Concent 35.7, Red Cell Distribution Width 11.7, Platelet Count 208, 

Mean Platelet Volume 9.0, Neutrophils (%) (Auto) 54.0, Lymphocytes (%) (Auto) 

30.8, Monocytes (%) (Auto) 10.3H, Eosinophils (%) (Auto) 3.8H, Basophils (%) (

Auto) 1.2, Sodium Level 139, Potassium Level 3.5, Chloride Level 102, Carbon 

Dioxide Level 30, Anion Gap 7, Blood Urea Nitrogen 12, Creatinine 1.1, Estimat 

Glomerular Filtration Rate > 60, Glucose Level 98, Calcium Level 9.0


Height (Feet):  5


Height (Inches):  7.00


Weight (Pounds):  170


General Appearance:  alert


EENT:  normal ENT inspection


Neck:  normal alignment


Cardiovascular:  normal peripheral pulses, normal rate, regular rhythm


Respiratory/Chest:  chest wall non-tender, lungs clear, normal breath sounds


Abdomen:  normal bowel sounds, non tender, soft


Extremities:  normal inspection


Edema:  no edema noted Arm (L), no edema noted Arm (R), no edema noted Leg (L), 

no edema noted Leg (R), no edema noted Pedal (L), no edema noted Pedal (R), no 

edema noted Generalized


Neurologic:  responsive, motor weakness


Skin:  normal pigmentation, warm/dry











REBECCA VARMA Feb 12, 2018 13:42

## 2018-02-12 NOTE — IMMEDIATE POST-OP EVALUATION
Immediate Post-Op Evalulation


Immediate Post-Op Evalulation


Procedure:  Lap rudy


Date of Evaluation:  Feb 12, 2018


Time of Evaluation:  15:47


IV Fluids:  1000


Estimated Blood Loss:  30


Blood Pressure Systolic:  126


Blood Pressure Diastolic:  66


Pulse Rate:  86


Respiratory Rate:  8


O2 Sat by Pulse Oximetry:  100


Temperature (Fahrenheit):  98.2


Pain Score (1-10):  0


Nausea:  No


Vomiting:  No


Complications


No complication


Patient Status:  reacts, patent, extubated, none


Hydration Status:  adequate


Drug:  Ancef


Given Within 1 Hr of Incision:  Yes


Time Given:  13:50











PASTOR ESPINAL M.D. Feb 12, 2018 14:30

## 2018-02-12 NOTE — PULMONOLOGY PROGRESS NOTE
Assessment/Plan


Problems:  


(1) Cholecystitis, acute with cholelithiasis


(2) Recurrent biliary colic


Assessment/Plan


for surgery today


symptomatic treatment


IV fluids


check electrolytes/





Subjective


ROS Limited/Unobtainable:  No


Constitutional:  Reports: no symptoms


HEENT:  Repors: no symptoms


Cardiovascular:  Reports: no symptoms


Allergies:  


Coded Allergies:  


     No Known Allergies (Unverified , 2/6/18)





Objective





Last 24 Hour Vital Signs








  Date Time  Temp Pulse Resp B/P (MAP) Pulse Ox O2 Delivery O2 Flow Rate FiO2


 


2/12/18 12:00 97.3 58 20 117/64 96 Room Air  


 


2/12/18 08:00 97.8 73 16 130/68 99 Room Air  


 


2/12/18 04:00 97.7 62 16 105/55 98 Room Air  


 


2/11/18 20:00 97.8 67 18 101/64 97 Room Air  


 


2/11/18 17:00 97.9 63 18 101/51 97 Room Air  

















Intake and Output  


 


 2/11/18 2/12/18





 19:00 07:00


 


Intake Total 825 ml 800 ml


 


Balance 825 ml 800 ml


 


  


 


IV Total 825 ml 800 ml








Objective


General Appearance:  WN/WD


HEENT:  normocephalic, atraumatic


Respiratory/Chest:  chest wall non-tender, normal breath sounds


Cardiovascular:  normal peripheral pulses, normal rate


Abdomen:  soft, non tender, no organomegaly


Genitourinary:  normal external genitalia


Extremities:  no clubbing


Skin:  no rash, no lesions


Neurologic/Psychiatric:  CNs II-XII grossly normal, 


Lymphatic:  no neck adenopathy





Microbiology








 Date/Time


Source Procedure


Growth Status


 


 


 2/10/18 06:20


Nasal Nares MRSA Culture - Final


NO METHICILLIN RESISTANT STAPH AUREUS... Complete


 


 2/10/18 06:20


Rectum VRE Culture - Final


NO VANCOMYCIN RESISTANT ENTEROCOCCUS ... Complete








Laboratory Tests


2/12/18 04:30: 


White Blood Count 5.0, Red Blood Count 4.96, Hemoglobin 15.6, Hematocrit 43.6, 

Mean Corpuscular Volume 88, Mean Corpuscular Hemoglobin 31.4H, Mean Corpuscular 

Hemoglobin Concent 35.7, Red Cell Distribution Width 11.7, Platelet Count 208, 

Mean Platelet Volume 9.0, Neutrophils (%) (Auto) 54.0, Lymphocytes (%) (Auto) 

30.8, Monocytes (%) (Auto) 10.3H, Eosinophils (%) (Auto) 3.8H, Basophils (%) (

Auto) 1.2, Sodium Level 139, Potassium Level 3.5, Chloride Level 102, Carbon 

Dioxide Level 30, Anion Gap 7, Blood Urea Nitrogen 12, Creatinine 1.1, Estimat 

Glomerular Filtration Rate > 60, Glucose Level 98, Calcium Level 9.0





Current Medications








 Medications


  (Trade)  Dose


 Ordered  Sig/Anali


 Route


 PRN Reason  Start Time


 Stop Time Status Last Admin


Dose Admin


 


 Acetaminophen


  (Tylenol)  650 mg  Q4H  PRN


 ORAL


 fever  2/10/18 07:45


 3/12/18 07:44   


 


 


 Al Hydroxide/Mg


 Hydroxide


  (Mylanta II)  30 ml  Q6H  PRN


 ORAL


 dyspepsia  2/10/18 07:45


 3/12/18 07:44   


 


 


 Dextrose


  (Dextrose 50%)    STAT  PRN


 IV


 Hypoglycemia  2/10/18 07:45


 3/12/18 07:44   


 


 


 Dextrose/Sodium


 Chloride  1,000 ml @ 


 100 mls/hr  Q10H


 IV


   2/11/18 15:45


 3/13/18 15:44  2/12/18 09:35


 


 


 Diphenhydramine


 HCl


  (Benadryl)  25 mg  Q6H  PRN


 ORAL


 Itching/Pruritis  2/10/18 07:45


 3/12/18 07:44   


 


 


 Famotidine


  (Pepcid)  20 mg  DAILY


 ORAL


   2/10/18 13:00


 3/12/18 12:59  2/12/18 09:35


 


 


 Morphine Sulfate


  (Morphine


 Sulfate)  2 mg  Q4H  PRN


 IVP


 severe  Pain (Pain Scale 7-10)  2/10/18 07:45


 2/17/18 07:44   


 


 


 Nitroglycerin


  (Ntg)  0.4 mg  Q5M X 3 DOSES PRN


 SL


 Prn Chest Pain  2/10/18 07:45


 3/12/18 07:44   


 


 


 Ondansetron HCl


  (Zofran)  4 mg  Q6H  PRN


 IVP


 Nausea & Vomiting  2/10/18 07:45


 3/12/18 07:44   


 


 


 Pantoprazole


  (Protonix)  40 mg  DAILY


 IVP


   2/10/18 09:00


 3/12/18 08:59  2/12/18 09:35


 


 


 Polyethylene


 Glycol


  (Miralax)  17 gm  HSPRN  PRN


 ORAL


 Constipation  2/10/18 07:45


 3/12/18 07:44   


 


 


 Temazepam


  (Restoril)  15 mg  HSPRN  PRN


 ORAL


 Insomnia  2/10/18 07:45


 2/17/18 07:44   


 

















HESHAM RUBIO Feb 12, 2018 13:24

## 2018-02-12 NOTE — BRIEF OPERATIVE NOTE
Immediate Post Operative Note


Operative Note


Pre-op Diagnosis:


cholecystitis


Procedure:


laparoscopic cholecystectomy


Post-op Diagnosis:


cholelithiasis, cholecystitis


Surgeon:  addie


Anesthesiologist:  maria isabel


Anesthesia:  general, local


Specimen:  yes - gallbladder and stones


Complications:  none


Condition:  stable


Fluids:  see records


Estimated Blood Loss:  minimal


Drains:  none


Implant(s) used?:  No











Lukas Greene Feb 12, 2018 15:33

## 2018-02-12 NOTE — GI PROGRESS NOTE
Assessment/Plan


Problems:  


(1) Abdominal pain


ICD Codes:  R10.9 - Unspecified abdominal pain


SNOMED:  84295238


(2) Recurrent biliary colic


ICD Codes:  K80.50 - Calculus of bile duct without cholangitis or cholecystitis 

without obstruction


SNOMED:  04384378


(3) Cholelithiasis


ICD Codes:  K80.20 - Calculus of gallbladder without cholecystitis without 

obstruction


SNOMED:  415125382


Qualifiers:  


   Qualified Codes:  K80.20 - Calculus of gallbladder without cholecystitis 

without obstruction


(4) GERD (gastroesophageal reflux disease)


ICD Codes:  K21.9 - Gastro-esophageal reflux disease without esophagitis


SNOMED:  580989206


Status:  unchanged


Status Narrative


Discussed with Dr. Jaime.


Assessment/Plan


Assessment


- Cholelithiasis


- cholecystitis vs biliary colic





Recommendations


fu surgical recs >> lap rudy today


pain mgmt


IVFs


fu labs





Subjective


Gastrointestinal/Abdominal:  Reports: abdominal pain





Objective





Last 24 Hour Vital Signs








  Date Time  Temp Pulse Resp B/P (MAP) Pulse Ox O2 Delivery O2 Flow Rate FiO2


 


2/12/18 08:00 97.8 73 16 130/68 99 Room Air  


 


2/12/18 04:00 97.7 62 16 105/55 98 Room Air  


 


2/11/18 20:00 97.8 67 18 101/64 97 Room Air  


 


2/11/18 17:00 97.9 63 18 101/51 97 Room Air  

















Intake and Output  


 


 2/11/18 2/12/18





 19:00 07:00


 


Intake Total 825 ml 800 ml


 


Balance 825 ml 800 ml


 


  


 


IV Total 825 ml 800 ml











Laboratory Tests








Test


  2/12/18


04:30


 


White Blood Count


  5.0 K/UL


(4.8-10.8)


 


Red Blood Count


  4.96 M/UL


(4.70-6.10)


 


Hemoglobin


  15.6 G/DL


(14.2-18.0)


 


Hematocrit


  43.6 %


(42.0-52.0)


 


Mean Corpuscular Volume 88 FL (80-99)  


 


Mean Corpuscular Hemoglobin


  31.4 PG


(27.0-31.0)  H


 


Mean Corpuscular Hemoglobin


Concent 35.7 G/DL


(32.0-36.0)


 


Red Cell Distribution Width


  11.7 %


(11.6-14.8)


 


Platelet Count


  208 K/UL


(150-450)


 


Mean Platelet Volume


  9.0 FL


(6.5-10.1)


 


Neutrophils (%) (Auto)


  54.0 %


(45.0-75.0)


 


Lymphocytes (%) (Auto)


  30.8 %


(20.0-45.0)


 


Monocytes (%) (Auto)


  10.3 %


(1.0-10.0)  H


 


Eosinophils (%) (Auto)


  3.8 %


(0.0-3.0)  H


 


Basophils (%) (Auto)


  1.2 %


(0.0-2.0)


 


Sodium Level


  139 MMOL/L


(136-145)


 


Potassium Level


  3.5 MMOL/L


(3.5-5.1)


 


Chloride Level


  102 MMOL/L


()


 


Carbon Dioxide Level


  30 MMOL/L


(21-32)


 


Anion Gap


  7 mmol/L


(5-15)


 


Blood Urea Nitrogen


  12 mg/dL


(7-18)


 


Creatinine


  1.1 MG/DL


(0.55-1.30)


 


Estimat Glomerular Filtration


Rate > 60 mL/min


(>60)


 


Glucose Level


  98 MG/DL


()


 


Calcium Level


  9.0 MG/DL


(8.5-10.1)








Height (Feet):  5


Height (Inches):  7.00


Weight (Pounds):  170


General Appearance:  WD/WN, no apparent distress, alert


Cardiovascular:  normal rate


Respiratory/Chest:  normal breath sounds, no respiratory distress


Abdominal Exam:  normal bowel sounds, non tender, soft


Extremities:  normal range of motion, non-tender











June Mccarty N.P. Feb 12, 2018 11:29

## 2018-02-13 VITALS — DIASTOLIC BLOOD PRESSURE: 84 MMHG | SYSTOLIC BLOOD PRESSURE: 135 MMHG

## 2018-02-13 VITALS — SYSTOLIC BLOOD PRESSURE: 131 MMHG | DIASTOLIC BLOOD PRESSURE: 81 MMHG

## 2018-02-13 VITALS — DIASTOLIC BLOOD PRESSURE: 81 MMHG | SYSTOLIC BLOOD PRESSURE: 120 MMHG

## 2018-02-13 LAB
ADD MANUAL DIFF: NO
ALBUMIN SERPL-MCNC: 3.5 G/DL (ref 3.4–5)
ALBUMIN/GLOB SERPL: 0.9 {RATIO} (ref 1–2.7)
ALP SERPL-CCNC: 68 U/L (ref 46–116)
ALT SERPL-CCNC: 100 U/L (ref 12–78)
ANION GAP SERPL CALC-SCNC: 8 MMOL/L (ref 5–15)
AST SERPL-CCNC: 64 U/L (ref 15–37)
BASOPHILS NFR BLD AUTO: 0.8 % (ref 0–2)
BILIRUB SERPL-MCNC: 0.7 MG/DL (ref 0.2–1)
BUN SERPL-MCNC: 7 MG/DL (ref 7–18)
CALCIUM SERPL-MCNC: 8.9 MG/DL (ref 8.5–10.1)
CHLORIDE SERPL-SCNC: 101 MMOL/L (ref 98–107)
CO2 SERPL-SCNC: 29 MMOL/L (ref 21–32)
CREAT SERPL-MCNC: 1.1 MG/DL (ref 0.55–1.3)
EOSINOPHIL NFR BLD AUTO: 0.3 % (ref 0–3)
ERYTHROCYTE [DISTWIDTH] IN BLOOD BY AUTOMATED COUNT: 11.5 % (ref 11.6–14.8)
GLOBULIN SER-MCNC: 4 G/DL
HCT VFR BLD CALC: 45.4 % (ref 42–52)
HGB BLD-MCNC: 15.9 G/DL (ref 14.2–18)
LYMPHOCYTES NFR BLD AUTO: 17.1 % (ref 20–45)
MCV RBC AUTO: 88 FL (ref 80–99)
MONOCYTES NFR BLD AUTO: 12 % (ref 1–10)
NEUTROPHILS NFR BLD AUTO: 69.9 % (ref 45–75)
PLATELET # BLD: 211 K/UL (ref 150–450)
POTASSIUM SERPL-SCNC: 3.8 MMOL/L (ref 3.5–5.1)
RBC # BLD AUTO: 5.16 M/UL (ref 4.7–6.1)
SODIUM SERPL-SCNC: 138 MMOL/L (ref 136–145)
WBC # BLD AUTO: 7.5 K/UL (ref 4.8–10.8)

## 2018-02-13 RX ADMIN — DOCUSATE SODIUM SCH MG: 100 CAPSULE, LIQUID FILLED ORAL at 07:54

## 2018-02-13 RX ADMIN — MORPHINE SULFATE PRN MG: 4 INJECTION INTRAVENOUS at 12:33

## 2018-02-13 RX ADMIN — DEXTROSE AND SODIUM CHLORIDE SCH MLS/HR: 5; .45 INJECTION, SOLUTION INTRAVENOUS at 05:20

## 2018-02-13 RX ADMIN — MORPHINE SULFATE PRN MG: 4 INJECTION INTRAVENOUS at 07:55

## 2018-02-13 RX ADMIN — MORPHINE SULFATE PRN MG: 4 INJECTION INTRAVENOUS at 02:37

## 2018-02-13 NOTE — 48 HOUR POST ANESTHESIA EVAL
Post Anesthesia Evaluation


Procedure:  Lap rudy


Date of Evaluation:  Feb 13, 2018


Time of Evaluation:  15:21


Blood Pressure Systolic:  131


0:  81


Pulse Rate:  66


Respiratory Rate:  20


Temperature (Fahrenheit):  99.2


O2 Sat by Pulse Oximetry:  97


Airway:  patent


Nausea:  No


Vomiting:  No


Pain Intensity:  2


Hydration Status:  adequate


Cardiopulmonary Status:


Stable


Mental Status/LOC:  patient returned to baseline


Follow-up Care/Observations:


0


Post-Anesthesia Complications:


0


Follow-up care needed:  N/A











Devon Juarez MD Feb 13, 2018 15:22

## 2018-02-13 NOTE — GENERAL PROGRESS NOTE
Progress Note


Progress Note


Surgery:





doing well.  incisional tenderness.  no n/v/f/c.  tolerating diet.  sleeping 

all day and refusing to be more active.  non compliant with post op plans for 

ambulating and being active.  


Afebrile, HD stable, labs okay


abdomen soft, non distended, incisional tenderness, wounds c/d/i, bs+





POD #1 s/p lap rudy. recovering


-diet as tolerated, recommend low fat


-activity as tolerated, no lifting >15lbs for 4-5 weeks. 


-Rx tylenol #3 and colace


-wound care instructions given in detail to patient and family member


-post op and home instructions given in detail


-follow up with me in 1-2 weeks for staple removal and wound check, office info 

given


-d/c home











Lukas Greene Feb 13, 2018 15:21

## 2018-02-13 NOTE — GENERAL PROGRESS NOTE
Assessment/Plan


Problem List:  


(1) Cholecystitis, acute with cholelithiasis


ICD Codes:  K80.00 - Calculus of gallbladder with acute cholecystitis without 

obstruction


SNOMED:  44130674


Qualifiers:  


   Qualified Codes:  K80.00 - Calculus of gallbladder with acute cholecystitis 

without obstruction


(2) Recurrent biliary colic


ICD Codes:  K80.50 - Calculus of bile duct without cholangitis or cholecystitis 

without obstruction


SNOMED:  74241639


(3) Cholelithiasis


ICD Codes:  K80.20 - Calculus of gallbladder without cholecystitis without 

obstruction


SNOMED:  168135208


Qualifiers:  


   Qualified Codes:  K80.20 - Calculus of gallbladder without cholecystitis 

without obstruction


(4) GERD (gastroesophageal reflux disease)


ICD Codes:  K21.9 - Gastro-esophageal reflux disease without esophagitis


SNOMED:  120052774


(5) Abdominal pain


ICD Codes:  R10.9 - Unspecified abdominal pain


SNOMED:  27011671


Status:  unchanged


Assessment/Plan


npo ivf abx cbc bmp am pain control gi/sx f/u s/p cholecystectomy





Subjective


Constitutional:  Reports: weakness


Allergies:  


Coded Allergies:  


     No Known Allergies (Unverified , 2/6/18)


All Systems:  reviewed and negative except above





Objective





Last 24 Hour Vital Signs








  Date Time  Temp Pulse Resp B/P (MAP) Pulse Ox O2 Delivery O2 Flow Rate FiO2


 


2/13/18 08:15 99.2 66 20 131/81 97 Room Air  


 


2/13/18 04:00 98.3 81 18 135/84 97 Room Air  





  88      


 


2/13/18 00:00 97.7 77 18 120/81 99 Room Air  





  81      


 


2/12/18 20:00 98.1 77 18 124/74 98 Room Air  





  82      


 


2/12/18 17:45 98.3 75 17 131/77 98 Nasal Cannula 3.0 





  88      


 


2/12/18 16:45 98.5 78 17 138/87 98 Nasal Cannula 3.0 





  91      


 


2/12/18 16:40 98.0       


 


2/12/18 16:35 98.0 92 15 141/87 98 Nasal Cannula 3.0 


 


2/12/18 16:20  88 15 140/85 100 Nasal Cannula 3.0 


 


2/12/18 16:10  83 17 139/84 100 Nasal Cannula 3.0 


 


2/12/18 16:10 98.2       


 


2/12/18 15:55  82 17 134/75 100 Simple Mask 6.0 


 


2/12/18 15:47  83 19 132/74 100 Simple Mask 6.0 


 


2/12/18 15:42  83 20 132/74 100 Simple Mask 6.0 


 


2/12/18 15:42 208.8 86 8  100   


 


2/12/18 15:37 98.2 83 19 126/65 100 Simple Mask 6.0 

















Intake and Output  


 


 2/12/18 2/13/18





 19:00 07:00


 


Intake Total 1100 ml 450 ml


 


Output Total 30 ml 


 


Balance 1070 ml 450 ml


 


  


 


IV Total 1100 ml 450 ml


 


Estimated Blood Loss 30 ml 








Laboratory Tests


2/13/18 05:15: 


White Blood Count 7.5, Red Blood Count 5.16, Hemoglobin 15.9, Hematocrit 45.4, 

Mean Corpuscular Volume 88, Mean Corpuscular Hemoglobin 30.8, Mean Corpuscular 

Hemoglobin Concent 35.0, Red Cell Distribution Width 11.5L, Platelet Count 211, 

Mean Platelet Volume 8.7, Neutrophils (%) (Auto) 69.9, Lymphocytes (%) (Auto) 

17.1L, Monocytes (%) (Auto) 12.0H, Eosinophils (%) (Auto) 0.3, Basophils (%) (

Auto) 0.8, Sodium Level 138, Potassium Level 3.8, Chloride Level 101, Carbon 

Dioxide Level 29, Anion Gap 8, Blood Urea Nitrogen 7, Creatinine 1.1, Estimat 

Glomerular Filtration Rate > 60, Glucose Level 106, Calcium Level 8.9, Total 

Bilirubin 0.7, Aspartate Amino Transf (AST/SGOT) 64H, Alanine Aminotransferase (

ALT/SGPT) 100H, Alkaline Phosphatase 68, Total Protein 7.5, Albumin 3.5, 

Globulin 4.0, Albumin/Globulin Ratio 0.9L


Height (Feet):  5


Height (Inches):  7.00


Weight (Pounds):  170


General Appearance:  lethargic


EENT:  normal ENT inspection


Neck:  normal alignment


Cardiovascular:  normal peripheral pulses, normal rate, regular rhythm


Respiratory/Chest:  chest wall non-tender, lungs clear, normal breath sounds


Abdomen:  hypoactive bowel sounds


Extremities:  normal inspection


Edema:  no edema noted Arm (L), no edema noted Arm (R), no edema noted Leg (L), 

no edema noted Leg (R), no edema noted Pedal (L), no edema noted Pedal (R), no 

edema noted Generalized


Neurologic:  responsive, motor weakness











REBECCA VARMA Feb 13, 2018 15:03

## 2018-02-13 NOTE — GI PROGRESS NOTE
Assessment/Plan


Problems:  


(1) Abdominal pain


ICD Codes:  R10.9 - Unspecified abdominal pain


SNOMED:  73256619


(2) Recurrent biliary colic


ICD Codes:  K80.50 - Calculus of bile duct without cholangitis or cholecystitis 

without obstruction


SNOMED:  82619203


(3) Cholelithiasis


ICD Codes:  K80.20 - Calculus of gallbladder without cholecystitis without 

obstruction


SNOMED:  419026945


Qualifiers:  


   Qualified Codes:  K80.20 - Calculus of gallbladder without cholecystitis 

without obstruction


(4) GERD (gastroesophageal reflux disease)


ICD Codes:  K21.9 - Gastro-esophageal reflux disease without esophagitis


SNOMED:  365248817


Status:  stable


Status Narrative


Discussed with Dr. Jaime.


Assessment/Plan


Assessment


s/p lap rudy





Recommendations


fu surgical recs


post op care


pain mgmt


adv diet as tolerated


IVFs


fu labs





Subjective


Gastrointestinal/Abdominal:  Reports: abdominal pain - at incision site





Objective





Last 24 Hour Vital Signs








  Date Time  Temp Pulse Resp B/P (MAP) Pulse Ox O2 Delivery O2 Flow Rate FiO2


 


2/13/18 08:15 99.2 66 20 131/81 97 Room Air  


 


2/13/18 04:00 98.3 81 18 135/84 97 Room Air  





  88      


 


2/13/18 00:00 97.7 77 18 120/81 99 Room Air  





  81      


 


2/12/18 20:00 98.1 77 18 124/74 98 Room Air  





  82      


 


2/12/18 17:45 98.3 75 17 131/77 98 Nasal Cannula 3.0 





  88      


 


2/12/18 16:45 98.5 78 17 138/87 98 Nasal Cannula 3.0 





  91      


 


2/12/18 16:40 98.0       


 


2/12/18 16:35 98.0 92 15 141/87 98 Nasal Cannula 3.0 


 


2/12/18 16:20  88 15 140/85 100 Nasal Cannula 3.0 


 


2/12/18 16:10  83 17 139/84 100 Nasal Cannula 3.0 


 


2/12/18 16:10 98.2       


 


2/12/18 15:55  82 17 134/75 100 Simple Mask 6.0 


 


2/12/18 15:47  83 19 132/74 100 Simple Mask 6.0 


 


2/12/18 15:42  83 20 132/74 100 Simple Mask 6.0 


 


2/12/18 15:42 208.8 86 8  100   


 


2/12/18 15:37 98.2 83 19 126/65 100 Simple Mask 6.0 

















Intake and Output  


 


 2/12/18 2/13/18





 19:00 07:00


 


Intake Total 1100 ml 450 ml


 


Output Total 30 ml 


 


Balance 1070 ml 450 ml


 


  


 


IV Total 1100 ml 450 ml


 


Estimated Blood Loss 30 ml 











Laboratory Tests








Test


  2/13/18


05:15


 


White Blood Count


  7.5 K/UL


(4.8-10.8)


 


Red Blood Count


  5.16 M/UL


(4.70-6.10)


 


Hemoglobin


  15.9 G/DL


(14.2-18.0)


 


Hematocrit


  45.4 %


(42.0-52.0)


 


Mean Corpuscular Volume 88 FL (80-99)  


 


Mean Corpuscular Hemoglobin


  30.8 PG


(27.0-31.0)


 


Mean Corpuscular Hemoglobin


Concent 35.0 G/DL


(32.0-36.0)


 


Red Cell Distribution Width


  11.5 %


(11.6-14.8)  L


 


Platelet Count


  211 K/UL


(150-450)


 


Mean Platelet Volume


  8.7 FL


(6.5-10.1)


 


Neutrophils (%) (Auto)


  69.9 %


(45.0-75.0)


 


Lymphocytes (%) (Auto)


  17.1 %


(20.0-45.0)  L


 


Monocytes (%) (Auto)


  12.0 %


(1.0-10.0)  H


 


Eosinophils (%) (Auto)


  0.3 %


(0.0-3.0)


 


Basophils (%) (Auto)


  0.8 %


(0.0-2.0)


 


Sodium Level


  138 MMOL/L


(136-145)


 


Potassium Level


  3.8 MMOL/L


(3.5-5.1)


 


Chloride Level


  101 MMOL/L


()


 


Carbon Dioxide Level


  29 MMOL/L


(21-32)


 


Anion Gap


  8 mmol/L


(5-15)


 


Blood Urea Nitrogen


  7 mg/dL (7-18)


 


 


Creatinine


  1.1 MG/DL


(0.55-1.30)


 


Estimat Glomerular Filtration


Rate > 60 mL/min


(>60)


 


Glucose Level


  106 MG/DL


()


 


Calcium Level


  8.9 MG/DL


(8.5-10.1)


 


Total Bilirubin


  0.7 MG/DL


(0.2-1.0)


 


Aspartate Amino Transf


(AST/SGOT) 64 U/L (15-37)


H


 


Alanine Aminotransferase


(ALT/SGPT) 100 U/L


(12-78)  H


 


Alkaline Phosphatase


  68 U/L


()


 


Total Protein


  7.5 G/DL


(6.4-8.2)


 


Albumin


  3.5 G/DL


(3.4-5.0)


 


Globulin 4.0 g/dL  


 


Albumin/Globulin Ratio


  0.9 (1.0-2.7)


L








Height (Feet):  5


Height (Inches):  7.00


Weight (Pounds):  170


General Appearance:  WD/WN, no apparent distress, alert


Cardiovascular:  normal rate


Respiratory/Chest:  normal breath sounds, no respiratory distress


Abdominal Exam:  normal bowel sounds, non tender, soft, incision site


Extremities:  normal range of motion, non-tender











June Mccarty N.P. Feb 13, 2018 14:58

## 2018-02-15 NOTE — DISCHARGE SUMMARY
Discharge Summary


Hospital Course


Date of Admission


Feb 10, 2018 at 05:10


Date of Discharge


Feb 13, 2018 at 16:17


Admitting Diagnosis


cholecystitis


YUNG Vargas is a 37 year old male who was admitted on Feb 10, 2018 at 05:

10 for Cholecystitis


Hospital Course


[id number]





Discharge


Discharge Disposition


Patient was discharged to Home (01)


Discharge Diagnoses:  











Felisha Goodman NP Feb 15, 2018 13:28

## 2018-02-15 NOTE — DISCHARGE SUMMARY 2 SIG
DATE OF ADMISSION:  02/10/2018



DATE OF DISCHARGE:  02/13/2018



CONSULTANTS:

1. Lukas Greene M.D.

2. Vish Pedraza M.D.

3. Cisco Jaime M.D.



BRIEF HOSPITAL COURSE:  The patient is a 37-year-old male, who came in

about four days ago with history of abdominal pain, nausea, and vomiting.

He was diagnosed with cholecystitis.  The patient was recently admitted

and was advised elective cholecystectomy.  He was discharged home, he was

eating well.  Apparently, the day prior to admission, he became nauseous and

vomited and had recurrent abdominal pain.  CT scan of the abdomen showed

gallbladder thickening with prominent prostate and seminal vesicles and

with fatty liver.  He had workup done.  Ultrasound from prior admission

showed cholelithiasis, but no cholecystitis.  HIDA scan was positive.  He

had a HIDA scan that showed nonvisualization of the gallbladder.  On

02/12/2018, he underwent laparoscopic cholecystectomy by Dr. Greene.

He tolerated procedure well.  Postoperatively, he patient had incisional 
tenderness,

however, without nausea or vomiting and was tolerating diet well.  

He was encouraged ambulation and OOB.  He was  eventually cleared for 

discharge.  



DISPOSITION:  The patient was discharged home.



FINAL DIAGNOSES:

1. Cholelithiasis, status post laparoscopic cholecystectomy.

2. Recurrent biliary colic.

3. Cholelithiasis.

4. Gastroesophageal reflux disease.



DISCHARGE DISPOSITION:  The patient was discharged home.



DISCHARGE MEDICATIONS:  Refer to medication list. He was given Tylenol No. 3 
and Colace

p.r.n. constipation and



DISCHARGE INSTRUCTIONS:  Follow up with Dr Greene in a week. Low fat diet.

 Advised no lifting more than 15 pounds for four to

five weeks.







  ______________________________________________

  Sergio Rios D.O.



I have been assigned to dictate discharge summary on this account and I

was not involved in the patient's management.



  ______________________________________________

  Felisha Goodman N.P.





DR:  LUIGI

D:  02/15/2018 13:27

T:  02/15/2018 22:57

JOB#:  [id number]

CC:



LEVI

## 2022-11-16 NOTE — GENERAL PROGRESS NOTE
Assessment/Plan


Assessment/Plan


Assessment


- Cholelithiasis


- cholecystitis vs biliary colic





Recommendations


- await lab rudy


- follow exam and symptoms





Subjective


Allergies:  


Coded Allergies:  


     No Known Allergies (Unverified , 2/6/18)





Objective





Last 24 Hour Vital Signs








  Date Time  Temp Pulse Resp B/P (MAP) Pulse Ox O2 Delivery O2 Flow Rate FiO2


 


2/10/18 17:08 98.3 61 20 121/68 99   


 


2/10/18 07:46  56 16 111/68 99 Room Air  


 


2/10/18 07:33 97.6 56 16 118/62 99 Room Air  


 


2/10/18 05:42 98.1       


 


2/10/18 04:30 98.1 65 16 120/78 99 Room Air  


 


2/10/18 04:27 98.1 65 16 120/78 99 Room Air  

















Intake and Output  


 


 2/9/18 2/10/18





 19:00 07:00


 


Intake Total  0 ml


 


Output Total  400 ml


 


Balance  -400 ml


 


  


 


Intake Oral  0 ml


 


Output Urine Total  400 ml








Laboratory Tests


2/10/18 04:55: 


White Blood Count 8.4, Red Blood Count 5.32, Hemoglobin 16.1, Hematocrit 46.6, 

Mean Corpuscular Volume 88, Mean Corpuscular Hemoglobin 30.3, Mean Corpuscular 

Hemoglobin Concent 34.6, Red Cell Distribution Width 11.7, Platelet Count 201, 

Mean Platelet Volume 8.7, Neutrophils (%) (Auto) 66.4, Lymphocytes (%) (Auto) 

19.3L, Monocytes (%) (Auto) 9.5, Eosinophils (%) (Auto) 4.1H, Basophils (%) (

Auto) 0.8, Sodium Level 137, Potassium Level 3.9, Chloride Level 101, Carbon 

Dioxide Level 30, Anion Gap 6, Blood Urea Nitrogen 12, Creatinine 1.0, Estimat 

Glomerular Filtration Rate > 60, Glucose Level 108H, Calcium Level 9.4, Total 

Bilirubin 0.6, Aspartate Amino Transf (AST/SGOT) 20, Alanine Aminotransferase (

ALT/SGPT) 46, Alkaline Phosphatase 71, Total Protein 7.7, Albumin 3.8, Globulin 

3.9, Albumin/Globulin Ratio 1.0, Lipase 193


2/10/18 05:43: 


Urine Color Pale yellow, Urine Appearance Clear, Urine pH 7, Urine Specific 

Gravity 1.005, Urine Protein Negative, Urine Glucose (UA) Negative, Urine 

Ketones Negative, Urine Occult Blood Negative, Urine Nitrite Negative, Urine 

Bilirubin Negative, Urine Urobilinogen Normal, Urine Leukocyte Esterase Negative


Height (Feet):  5


Height (Inches):  7.00


Weight (Pounds):  170











KARI PADILLA Feb 10, 2018 17:28 Purse String (Simple) Text: Given the location of the defect and the characteristics of the surrounding skin a purse string closure was deemed most appropriate.  Undermining was performed circumfirentially around the surgical defect.  A purse string suture was then placed and tightened.